# Patient Record
Sex: FEMALE | Race: WHITE | Employment: OTHER | RURAL
[De-identification: names, ages, dates, MRNs, and addresses within clinical notes are randomized per-mention and may not be internally consistent; named-entity substitution may affect disease eponyms.]

---

## 2017-11-14 ENCOUNTER — OFFICE VISIT (OUTPATIENT)
Dept: CARDIOLOGY CLINIC | Age: 77
End: 2017-11-14

## 2017-11-14 VITALS
DIASTOLIC BLOOD PRESSURE: 80 MMHG | WEIGHT: 178 LBS | BODY MASS INDEX: 26.98 KG/M2 | SYSTOLIC BLOOD PRESSURE: 108 MMHG | HEIGHT: 68 IN | HEART RATE: 80 BPM | RESPIRATION RATE: 18 BRPM | OXYGEN SATURATION: 97 %

## 2017-11-14 DIAGNOSIS — R00.2 PALPITATIONS: ICD-10-CM

## 2017-11-14 DIAGNOSIS — M05.79 RHEUMATOID ARTHRITIS INVOLVING MULTIPLE SITES WITH POSITIVE RHEUMATOID FACTOR (HCC): ICD-10-CM

## 2017-11-14 DIAGNOSIS — I47.1 PAT (PAROXYSMAL ATRIAL TACHYCARDIA) (HCC): Primary | ICD-10-CM

## 2017-11-14 RX ORDER — IBUPROFEN 200 MG
CAPSULE ORAL AS NEEDED
COMMUNITY
End: 2021-06-15

## 2017-11-14 RX ORDER — ERGOCALCIFEROL 1.25 MG/1
50000 CAPSULE ORAL
COMMUNITY

## 2017-11-14 RX ORDER — DICLOFENAC SODIUM 10 MG/G
GEL TOPICAL 4 TIMES DAILY
COMMUNITY
End: 2021-06-15

## 2017-11-14 NOTE — MR AVS SNAPSHOT
Visit Information Date & Time Provider Department Dept. Phone Encounter #  
 11/14/2017 11:20 AM Janneth Rouse MD [facility] [phone] [id number] Upcoming Health Maintenance Date Due FOBT Q 1 YEAR AGE 50-75 6/22/2017 MEDICARE YEARLY EXAM 6/23/2017 Influenza Age 5 to Adult 8/1/2017 GLAUCOMA SCREENING Q2Y 6/22/2018 DTaP/Tdap/Td series (2 - Td) 6/22/2026 Allergies as of 11/14/2017  Review Complete On: 11/14/2017 By: Janneth Rouse MD  
  
 Severity Noted Reaction Type Reactions Levaquin [Levofloxacin]  02/24/2016    Nausea Only Current Immunizations  Never Reviewed Name Date Influenza Vaccine 11/5/2014, 11/21/2013, 10/3/2012 Pneumococcal Conjugate (PCV-13) 1/18/2016 Pneumococcal Polysaccharide (PPSV-23) 2/6/2014 Not reviewed this visit You Were Diagnosed With   
  
 Codes Comments PAT (paroxysmal atrial tachycardia) (HCC)    -  Primary ICD-10-CM: I47.1 ICD-9-CM: 427.0 Palpitations     ICD-10-CM: R00.2 ICD-9-CM: 785.1 Rheumatoid arthritis involving multiple sites with positive rheumatoid factor (HCC)     ICD-10-CM: M05.79 ICD-9-CM: 714.0 Vitals BP Pulse Resp Height(growth percentile) Weight(growth percentile) SpO2  
 108/80 (BP 1 Location: Left arm, BP Patient Position: Sitting) 80 18 5' 7.5\" (1.715 m) 178 lb (80.7 kg) 97% BMI OB Status Smoking Status 27.47 kg/m2 Hysterectomy Former Smoker Vitals History BMI and BSA Data Body Mass Index Body Surface Area  
 27.47 kg/m 2 1.96 m 2 Preferred Pharmacy Pharmacy Name Phone [facility] [place]  [address] [phone] Your Updated Medication List  
  
   
This list is accurate as of: 11/14/17 12:00 PM.  Always use your most recent med list.  
  
  
  
  
 diphenhydrAMINE 25 mg tablet Commonly known as:  BENADRYL Take 12.5 mg by mouth nightly as needed for Sleep.  
  
 hydroxychloroquine 200 mg tablet Commonly known as:  PLAQUENIL Take 1 Tab by mouth daily. ibuprofen 200 mg Cap Take  by mouth as needed. sertraline 50 mg tablet Commonly known as:  ZOLOFT Take 1 Tab by mouth daily. VITAMIN D2 50,000 unit capsule Generic drug:  ergocalciferol Take 50,000 Units by mouth every seven (7) days. VOLTAREN 1 % Gel Generic drug:  diclofenac Apply  to affected area four (4) times daily. We Performed the Following AMB POC EKG ROUTINE W/ 12 LEADS, INTER & REP [74067 CPT(R)] To-Do List   
 Around 11/16/2017 ECHO:  2D ECHO COMPLETE ADULT (TTE) W OR WO CONTR Introducing [facility]! [employer] introduces [employer] patient portal. Now you can access parts of your medical record, email your doctor's office, and request medication refills online. 1. In your internet browser, go to [email or web address] 2. Click on the First Time User? Click Here link in the Sign In box. You will see the New Member Sign Up page. 3. Enter your [employer] Access Code exactly as it appears below. You will not need to use this code after youve completed the sign-up process. If you do not sign up before the expiration date, you must request a new code. · [employer] Access Code: [email or web address] Expires: 2/12/2018 12:00 PM 
 
4. Enter the last four digits of your Social Security Number (xxxx) and Date of Birth (mm/dd/yyyy) as indicated and click Submit. You will be taken to the next sign-up page. 5. Create a [employer] ID. This will be your [employer] login ID and cannot be changed, so think of one that is secure and easy to remember. 6. Create a [employer] password. You can change your password at any time. 7. Enter your Password Reset Question and Answer. This can be used at a later time if you forget your password. 8. Enter your e-mail address. You will receive e-mail notification when new information is available in [address] 9. Click Sign Up. You can now view and download portions of your medical record. 10. Click the Download Summary menu link to download a portable copy of your medical information. If you have questions, please visit the Frequently Asked Questions section of the [email or web address] website. Remember, [email or web address] is NOT to be used for urgent needs. For medical emergencies, dial 911. Now available from your iPhone and Android! Please provide this summary of care documentation to your next provider. Your primary care clinician is listed as [facility]. If you have any questions after today's visit, please call [phone].

## 2017-11-14 NOTE — PROGRESS NOTES
Verified patient with two patient identifiers. Medications reviewed/approved by Dr. Kd Jean. Verbal from Dr. Kd Jean to remove the medications that were deleted during the visit.

## 2017-11-14 NOTE — PROGRESS NOTES
Anoop Hayes is a 78 y.o. female is here for cardiac evaluation-- palpitations. No prior known cardiac hx. Hx Rheumatoid arthritis (on Plaquenil, occ steroids), osteopenia (fosamax), anxiety, tobacco use. No hx DM, hypertension, dyslipidemia (known), PVD, CVA, CAD/CHF. Recent office visit with  Dr. Christina Montoya to have irregular heart rhythm (minimal sx), labs 10/13/17 with CBC, CMP, mg normal.  Holter monitor 10/23/17 with NSR/sinus arrhythmia (HR ), PAC's, with 24 runs of SVT/PAT up to 7 beats. The patient denies chest pain/ shortness of breath, orthopnea, PND, LE edema, syncope, presyncope or fatigue. Patient Active Problem List    Diagnosis Date Noted    Palpitations 11/14/2017    PAT (paroxysmal atrial tachycardia) (HCC) 11/14/2017    Rheumatoid arthritis with positive rheumatoid factor ([facility] [address] 02/24/2016    Osteopenia 02/24/2016    MARLY (generalized anxiety disorder) 02/24/2016    Smoker 02/24/2016      Elvin Dakin, MD  Past Medical History:   Diagnosis Date    Osteopenia 11/6/2015    PAC (premature atrial contraction)     PAT (paroxysmal atrial tachycardia) (HCC)     Rheumatoid arthritis (HCC)       Past Surgical History:   Procedure Laterality Date    HX APPENDECTOMY      spleen    HX HYSTERECTOMY      HX SHOULDER ARTHROSCOPY      HX SPLENECTOMY       Allergies   Allergen Reactions    Levaquin [Levofloxacin] Not Reported This Time      Family History   Problem Relation Age of Onset    Breast Cancer Mother     Breast Cancer Sister     Diabetes Father     Heart Disease Father       Social History     Social History    Marital status:      Spouse name: N/A    Number of children: N/A    Years of education: N/A     Occupational History    Not on file.      Social History Main Topics    Smoking status: Former Smoker     Packs/day: 1.00     Types: Cigarettes     Quit date: 4/30/2016    Smokeless tobacco: Never Used    Alcohol use 4.2 oz/week     7 Standard drinks or equivalent per week    Drug use: No    Sexual activity: Not on file     Other Topics Concern    Not on file     Social History Narrative      Current Outpatient Prescriptions   Medication Sig    predniSONE (DELTASONE) 20 mg tablet 5 po every day x 4 days then 4 on day 5, 3 on day 6, 2 on day 7, and 1 on day 8    sertraline (ZOLOFT) 50 mg tablet Take 1 Tab by mouth daily.  hydroxychloroquine (PLAQUENIL) 200 mg tablet Take 1 Tab by mouth daily.  alendronate (FOSAMAX) 70 mg tablet Take 70 mg by mouth every Sunday.  methylPREDNISolone (MEDROL DOSEPACK) 4 mg tablet PRN RA  Indications: RHEUMATOID ARTHRITIS    diphenhydrAMINE (BENADRYL) 25 mg tablet Take 12.5 mg by mouth nightly as needed for Sleep. No current facility-administered medications for this visit. Review of Symptoms:    CONST  No weight change. No fever, chills, sweats    ENT No visual changes, URI sx, sore throat    CV  See HPI   RESP  No cough, or sputum, wheezing. Also see HPI   GI  No abdominal pain or change in bowel habits. No heartburn or dysphagia. No melena or rectal bleeding.   No dysuria, urgency, frequency, hematuria   MSKEL  No joint pain, swelling. No muscle pain. SKIN  No rash or lesions. NEURO  No headache, syncope, or seizure. No weakness, loss of sensation, or paresthesias. PSYCH  No low mood or depression  No anxiety. HE/LYMPH  No easy bruising, abnormal bleeding, or enlarged glands.         Physical ExamPhysical Exam:    Visit Vitals    Resp 18    Ht 5' 7.5\" (1.715 m)    Wt 178 lb (80.7 kg)    BMI 27.47 kg/m2     Gen: NAD  HEENT:  PERRL, throat clear  Neck: no adenopathy, no thyromegaly, no JVD   Heart:  Regular,Nl S1S2,  no murmur, gallop or rub.   Lungs:  clear  Abdomen:   Soft, non-tender, bowel sounds are active.   Extremities:  No edema  Pulse: symmetric  Neuro: A&O times 3, No focal neuro deficits    Cardiographics    ECG: NSR, IRBBB      Assessment:         Patient Active Problem List    Diagnosis Date Noted    Palpitations 11/14/2017    PAT (paroxysmal atrial tachycardia) (HCC) 11/14/2017    Rheumatoid arthritis with positive rheumatoid factor ([facility] [address] 02/24/2016    Osteopenia 02/24/2016    MARLY (generalized anxiety disorder) 02/24/2016    Smoker 02/24/2016       78 y.o. female is here for cardiac evaluation-- palpitations. No prior known cardiac hx. Hx Rheumatoid arthritis (on Plaquenil, occ steroids), osteopenia (fosamax), anxiety, tobacco use. No hx DM, hypertension, dyslipidemia (known), PVD, CVA, CAD/CHF. Recent office visit with  Dr. Neelima Doll to have irregular heart rhythm (minimal sx), labs 10/13/17 with CBC, CMP, mg normal.  Holter monitor 10/23/17 with NSR/sinus arrhythmia (HR ), PAC's, with 24 runs of SVT/PAT up to 7 beats. No CP, YORK, other CV sx or complaints.      Plan:     Echo/doppler--call w/ results  Has already had out-pt Holter  Discussed possible stress treadmill--she prefers to hold off and no real sx indicating need  Lipids, thyroids per PCP  Is \"weaning\" nicotine replacement gum  Limited ETOH, caffeine  F/u with PCP as planned  No indication for rx at this point, minimal sx    Mary Ross MD

## 2017-11-30 ENCOUNTER — TELEPHONE (OUTPATIENT)
Dept: CARDIOLOGY CLINIC | Age: 77
End: 2017-11-30

## 2017-11-30 NOTE — TELEPHONE ENCOUNTER
----- Message from Charlee Shine MD sent at 11/30/2017  8:51 AM EST -----  Regarding: RE: echo  Can f/u prn here (no real sx or indication for treatment at this point), otherwise f/u with PCP. Thanks [facility]    ----- Message -----     From: Patrisha Opitz, LPN     Sent: 18/14/4661   4:45 PM       To: Charlee Shine MD  Subject: FW: echo                                          Did you want to see this pt in follow up? Rianna Bright  ----- Message -----     From: Charlee Shine MD     Sent: 11/29/2017   9:25 AM       To: Patrisha Opitz, LPN  Subject: echo                                             Call/advise echo looks good, normal LV pumping function/valves, overall ok.   Thanks [employer]

## 2017-11-30 NOTE — TELEPHONE ENCOUNTER
Verified patient with two patient identifiers. Results given and questions answered. Patient verbalized understanding.

## 2019-12-12 ENCOUNTER — CLINICAL SUPPORT (OUTPATIENT)
Dept: CARDIOLOGY CLINIC | Age: 79
End: 2019-12-12

## 2019-12-12 ENCOUNTER — OFFICE VISIT (OUTPATIENT)
Dept: CARDIOLOGY CLINIC | Age: 79
End: 2019-12-12

## 2019-12-12 VITALS
WEIGHT: 176 LBS | RESPIRATION RATE: 12 BRPM | SYSTOLIC BLOOD PRESSURE: 150 MMHG | BODY MASS INDEX: 26.67 KG/M2 | OXYGEN SATURATION: 97 % | HEIGHT: 68 IN | DIASTOLIC BLOOD PRESSURE: 80 MMHG | HEART RATE: 90 BPM

## 2019-12-12 DIAGNOSIS — I49.9 CARDIAC ARRHYTHMIA, UNSPECIFIED CARDIAC ARRHYTHMIA TYPE: ICD-10-CM

## 2019-12-12 DIAGNOSIS — I47.1 PAT (PAROXYSMAL ATRIAL TACHYCARDIA) (HCC): ICD-10-CM

## 2019-12-12 DIAGNOSIS — R00.2 PALPITATIONS: Primary | ICD-10-CM

## 2019-12-12 DIAGNOSIS — R00.2 PALPITATIONS: ICD-10-CM

## 2019-12-12 RX ORDER — METOPROLOL SUCCINATE 25 MG/1
25 TABLET, EXTENDED RELEASE ORAL DAILY
Qty: 90 TAB | Refills: 0 | Status: SHIPPED | OUTPATIENT
Start: 2019-12-12 | End: 2020-03-09

## 2019-12-12 NOTE — PATIENT INSTRUCTIONS
Per Dr. Marshall Nieves: 
Michael Carrier like Ms. Jens Yarbrough is having more svt, perhaps even some afib. Will start toprol xl and provide her with a 30day event monitor to make sure no afib. If only svt and medication (metoprolol)  ineffective would refer for ablation. Educational material will be provided.

## 2019-12-12 NOTE — PROGRESS NOTES
metoprolol succinate (TOPROL-XL) 25 mg XL tablet [[id number]]     Order Details   Dose: 25 mg Route: Oral Frequency: DAILY   Dispense Quantity: 90 Tab Refills: 0 Fills remaining: --           Sig: Take 1 Tab by mouth daily. Verbal order per Dr. Tara Lamas. Order (medication, dose, route, frequency, amount, refills) repeated and verified twice.

## 2019-12-12 NOTE — PROGRESS NOTES
PATIENT ID VERIFIED WITH TWO PATIENT IDENTIFIERS. PATIENT MEDICATIONS REVIEWED AND APPROVED BY . Chief Complaint   Patient presents with    Palpitations     Problem visit--last seen in office 2 years ago       1. Have you been to the ER, urgent care clinic since your last visit? Hospitalized since your last visit? Last seen in office 2 years ago    2. Have you seen or consulted any other health care providers outside of the [address] [place] [address] since your last visit? Include any pap smears or colon screening.  Last seen 2 years ago

## 2019-12-12 NOTE — PROGRESS NOTES
HISTORY OF PRESENT ILLNESS  Bethany Griffith is a 70 y.o. female     SUMMARY:   Problem List  Date Reviewed: 12/12/2019          Codes Class Noted    Palpitations ICD-10-CM: R00.2  ICD-9-CM: 785.1  11/14/2017        PAT (paroxysmal atrial tachycardia) (HCC) ICD-10-CM: I47.1  ICD-9-CM: 427.0  11/14/2017        Rheumatoid arthritis with positive rheumatoid factor (HCC) ICD-10-CM: M05.9  ICD-9-CM: 714.0  2/24/2016        Osteopenia ICD-10-CM: M85.80  ICD-9-CM: 733.90  2/24/2016        MARLY (generalized anxiety disorder) ICD-10-CM: F41.1  ICD-9-CM: 300.02  2/24/2016        Smoker ICD-10-CM: B79.450  ICD-9-CM: 305.1  2/24/2016              Current Outpatient Medications on File Prior to Visit   Medication Sig    ibuprofen 200 mg cap Take  by mouth as needed.  sertraline (ZOLOFT) 50 mg tablet Take 1 Tab by mouth daily.  hydroxychloroquine (PLAQUENIL) 200 mg tablet Take 1 Tab by mouth daily.  diphenhydrAMINE (BENADRYL) 25 mg tablet Take 25 mg by mouth nightly.  diclofenac (VOLTAREN) 1 % gel Apply  to affected area four (4) times daily.  ergocalciferol (VITAMIN D2) 50,000 unit capsule Take 50,000 Units by mouth every seven (7) days. No current facility-administered medications on file prior to visit. CARDIOLOGY STUDIES TO DATE:  10/17 holter rare pvcs, freq pacs, 24 short runs of svt  11/17 normal echo  9/18 neg exercise stress test    Chief Complaint   Patient presents with    Palpitations     Problem visit--last seen in office 2 years ago     HPI : ms. Shania Pineda returns having not been seen for about 2 yrs. She is having worsening palpitations, maybe 2-3 times per day lasting 10-20 secs, often stopped with a limited valsalva maneuver. She is active but no regular exercise. I have reviewed and summarized her prior testing above. Some ongoing arthragias related to her rheumatoid.   CARDIAC ROS:   negative for chest pain, dyspnea, syncope, orthopnea, paroxysmal nocturnal dyspnea, exertional chest pressure/discomfort, claudication, lower extremity edema    Family History   Problem Relation Age of Onset    Breast Cancer Mother     Breast Cancer Sister     Diabetes Father     Heart Disease Father        Past Medical History:   Diagnosis Date    Menopause     Osteopenia 11/6/2015    PAC (premature atrial contraction)     PAT (paroxysmal atrial tachycardia) (HCC)     Rheumatoid arthritis (HCC)        GENERAL ROS:  A comprehensive review of systems was negative except for that written in the HPI. Visit Vitals  /80 (BP 1 Location: Right arm, BP Patient Position: Sitting)   Pulse 90   Resp 12   Ht 5' 7.5\" (1.715 m)   Wt 176 lb (79.8 kg)   SpO2 97%   BMI 27.16 kg/m²       Wt Readings from Last 3 Encounters:   12/12/19 176 lb (79.8 kg)   11/14/17 178 lb (80.7 kg)   06/22/16 176 lb 9.6 oz (80.1 kg)            BP Readings from Last 3 Encounters:   12/12/19 150/80   11/14/17 108/80   09/15/16 134/60       PHYSICAL EXAM  General appearance: alert, cooperative, no distress, appears stated age  Neurologic: Alert and oriented X 3  Neck: supple, symmetrical, trachea midline, no adenopathy, no carotid bruit and no JVD  Lungs: clear to auscultation bilaterally  Abdomen: soft, non-tender. Bowel sounds normal. No masses,  no organomegaly  Extremities: extremities normal, atraumatic, no cyanosis or edema      ASSESSMENT  Sounds like she is having more svt, perhaps even some afib. Will start toprol xl and provide her with a 30day event monitor to make sure no afib. If only svt and medications ineffective would refer for ablation. current treatment plan is effective, no change in therapy  lab results and schedule of future lab studies reviewed with patient  reviewed diet, exercise and weight control    Encounter Diagnoses   Name Primary?     Palpitations Yes    PAT (paroxysmal atrial tachycardia) (HCC)      Orders Placed This Encounter    AMB POC EKG ROUTINE W/ 12 LEADS, INTER & REP    metoprolol succinate (TOPROL-XL) 25 mg XL tablet       Follow-up and Dispositions    · Return in about 4 weeks (around 1/9/2020).          Janice Lopez MD  12/12/2019

## 2019-12-16 NOTE — PROGRESS NOTES
Enrolled pt into [employer] Experience system. EM will be mailed to the pt. 24 hour Holter monitor only. Verified patient with two patient identifiers. Pt verbalized understanding of its use. Ordering YULIET Brumfield Signs (reading Randalyn Rings)  Reason: Palpitations [R00.2 (ICD-10-CM)]; PAT (paroxysmal atrial tachycardia) ([facility] [address] [I47.1 (ICD-10-CM)]; Cardiac arrhythmia, unspecified cardiac arrhythmia type [I49.9 (ICD-10-CM)]    Return date: 1/11/20        No LOS.

## 2020-02-07 ENCOUNTER — TELEPHONE (OUTPATIENT)
Dept: CARDIOLOGY CLINIC | Age: 80
End: 2020-02-07

## 2020-02-07 NOTE — TELEPHONE ENCOUNTER
----- Message from Jurgen Coe MD sent at 1/28/2020 12:15 PM EST -----  Regarding: Event monitor  Advise PAC\"s and short runs of atrial tachy (3-4 beats)--no afib seen. Continue metoprolol low dose. Followup 6 mos, sooner prn. Thanks [employer]    Spoke with the patient. Verified patient with two patient identifiers. Results given and questions answered. Patient verbalized understanding. Pt wants to keep her appt for next Thursday to go over results.

## 2020-02-27 ENCOUNTER — OFFICE VISIT (OUTPATIENT)
Dept: CARDIOLOGY CLINIC | Age: 80
End: 2020-02-27

## 2020-02-27 VITALS
OXYGEN SATURATION: 98 % | DIASTOLIC BLOOD PRESSURE: 82 MMHG | HEART RATE: 58 BPM | RESPIRATION RATE: 14 BRPM | WEIGHT: 176 LBS | SYSTOLIC BLOOD PRESSURE: 130 MMHG | HEIGHT: 68 IN | BODY MASS INDEX: 26.67 KG/M2

## 2020-02-27 DIAGNOSIS — R00.2 PALPITATIONS: Primary | ICD-10-CM

## 2020-02-27 DIAGNOSIS — M05.79 RHEUMATOID ARTHRITIS INVOLVING MULTIPLE SITES WITH POSITIVE RHEUMATOID FACTOR (HCC): ICD-10-CM

## 2020-02-27 DIAGNOSIS — I47.1 PAT (PAROXYSMAL ATRIAL TACHYCARDIA) (HCC): ICD-10-CM

## 2020-02-27 DIAGNOSIS — I49.1 PAC (PREMATURE ATRIAL CONTRACTION): ICD-10-CM

## 2020-02-27 NOTE — PROGRESS NOTES
Dilma Nazario is a 70 y.o. female is here for f/u--seen in Dec 2019 by Dr. Amelia Lu with increased palpitations--started low dose metoprolol, 30 day Event monitor (only 3 days) with PAC\"s and short runs of atrial tachy (3-4 beats)--no afib seen. Had Stress Treadmill last summer (ok) and Echo 11/17 with LVEF 10-46, grade I diastolic, otherwise normal.  Palpitations improved, no prolonged episodes. .  The patient denies chest pain/ shortness of breath, orthopnea, PND, LE edema,syncope, presyncope or fatigue.        Patient Active Problem List    Diagnosis Date Noted    Palpitations 11/14/2017    PAT (paroxysmal atrial tachycardia) [facility] 11/14/2017    Rheumatoid arthritis with positive rheumatoid factor ([facility] [address] 02/24/2016    Osteopenia 02/24/2016    MARLY (generalized anxiety disorder) 02/24/2016    Smoker 02/24/2016      Desire Guallpa MD  Past Medical History:   Diagnosis Date    Menopause     Osteopenia 11/6/2015    PAC (premature atrial contraction)     PAT (paroxysmal atrial tachycardia) (HCC)     Rheumatoid arthritis (HCC)       Past Surgical History:   Procedure Laterality Date    HX APPENDECTOMY      spleen    HX HYSTERECTOMY      HX OOPHORECTOMY      HX SHOULDER ARTHROSCOPY      HX SPLENECTOMY       Allergies   Allergen Reactions    Levaquin [Levofloxacin] Nausea Only      Family History   Problem Relation Age of Onset    Breast Cancer Mother     Breast Cancer Sister     Diabetes Father     Heart Disease Father       Social History     Socioeconomic History    Marital status:      Spouse name: Not on file    Number of children: Not on file    Years of education: Not on file    Highest education level: Not on file   Occupational History    Not on file   Social Needs    Financial resource strain: Not on file    Food insecurity:     Worry: Not on file     Inability: Not on file    Transportation needs:     Medical: Not on file     Non-medical: Not on file   Tobacco Use    Smoking status: Current Every Day Smoker     Packs/day: 1.00     Types: Cigarettes     Last attempt to quit: 4/30/2016     Years since quitting: 3.8    Smokeless tobacco: Never Used   Substance and Sexual Activity    Alcohol use: Yes     Alcohol/week: 7.0 standard drinks     Types: 7 Standard drinks or equivalent per week    Drug use: No    Sexual activity: Not on file   Lifestyle    Physical activity:     Days per week: Not on file     Minutes per session: Not on file    Stress: Not on file   Relationships    Social connections:     Talks on phone: Not on file     Gets together: Not on file     Attends [employer]odoxy service: Not on file     Active member of club or organization: Not on file     Attends meetings of clubs or organizations: Not on file     Relationship status: Not on file    Intimate partner violence:     Fear of current or ex partner: Not on file     Emotionally abused: Not on file     Physically abused: Not on file     Forced sexual activity: Not on file   Other Topics Concern    Not on file   Social History Narrative    Not on file      Current Outpatient Medications   Medication Sig    metoprolol succinate (TOPROL-XL) 25 mg XL tablet Take 1 Tab by mouth daily.  diclofenac (VOLTAREN) 1 % gel Apply  to affected area four (4) times daily.  ibuprofen 200 mg cap Take  by mouth as needed.  ergocalciferol (VITAMIN D2) 50,000 unit capsule Take 50,000 Units by mouth every seven (7) days.  sertraline (ZOLOFT) 50 mg tablet Take 1 Tab by mouth daily.  hydroxychloroquine (PLAQUENIL) 200 mg tablet Take 1 Tab by mouth daily.  diphenhydrAMINE (BENADRYL) 25 mg tablet Take 25 mg by mouth nightly. No current facility-administered medications for this visit. Review of Symptoms:    CONST  No weight change. No fever, chills, sweats    ENT No visual changes, URI sx, sore throat    CV  See HPI   RESP  No cough, or sputum, wheezing.  Also see HPI   GI  No abdominal pain or change in bowel habits. No heartburn or dysphagia. No melena or rectal bleeding.   No dysuria, urgency, frequency, hematuria   MSKEL  No joint pain, swelling. No muscle pain. SKIN  No rash or lesions. NEURO  No headache, syncope, or seizure. No weakness, loss of sensation, or paresthesias. PSYCH  No low mood or depression  No anxiety. HE/LYMPH  No easy bruising, abnormal bleeding, or enlarged glands. Physical ExamPhysical Exam:    There were no vitals taken for this visit. Gen: NAD  HEENT:  PERRL, throat clear  Neck: no adenopathy, no thyromegaly, no JVD   Heart:  Regular,Nl S1S2,  no murmur, gallop or rub.   Lungs:  clear  Abdomen:   Soft, non-tender, bowel sounds are active.   Extremities:  No edema  Pulse: symmetric  Neuro: A&O times 3, No focal neuro deficits    Cardiographics      Assessment:         Patient Active Problem List    Diagnosis Date Noted    Palpitations 11/14/2017    PAT (paroxysmal atrial tachycardia) ([facility] [address] 11/14/2017    Rheumatoid arthritis with positive rheumatoid factor ([facility] [address] 02/24/2016    Osteopenia 02/24/2016    MARLY (generalized anxiety disorder) 02/24/2016    Smoker 02/24/2016     70 y.o. female is here for f/u--seen in Dec 2019 by Dr. Vinicius Robles with increased palpitations--started low dose metoprolol, 30 day Event monitor (only 3 days) with PAC\"s and short runs of atrial tachy (3-4 beats)--no afib seen. Had Stress Treadmill last summer (ok) and Echo 11/17 with LVEF 71-20, grade I diastolic, otherwise normal.  Palpitations improved, no prolonged episodes. Plan:     Doing well with no adverse cardiac symptoms--improved with Metoprolol. .  Lipids and labs followed by PCP. Continue current care and f/u in 6 months.     Blake Molina MD

## 2020-02-27 NOTE — PROGRESS NOTES
Verified patient with two patient identifiers. Medications reviewed/approved by Dr. Anali Miranda. 1. Have you been to the ER, urgent care clinic since your last visit? Hospitalized since your last visit? no    2. Have you seen or consulted any other health care providers outside of the [address] [place] [address] since your last visit? Include any pap smears or colon screening.  no

## 2020-03-09 RX ORDER — METOPROLOL SUCCINATE 25 MG/1
TABLET, EXTENDED RELEASE ORAL
Qty: 90 TAB | Refills: 0 | Status: SHIPPED | OUTPATIENT
Start: 2020-03-09 | End: 2020-06-16 | Stop reason: SDUPTHER

## 2020-06-16 RX ORDER — METOPROLOL SUCCINATE 25 MG/1
TABLET, EXTENDED RELEASE ORAL
Qty: 90 TAB | Refills: 1 | Status: SHIPPED | OUTPATIENT
Start: 2020-06-16 | End: 2020-12-15

## 2020-06-16 NOTE — TELEPHONE ENCOUNTER
[address] called stating that they have been trying to get a refill on her Metoprolol 25mg xl sent over to them, and have not hear anything back. It looks like it was last written by Dr. Moncho Singer. Is this something that we can do or does he have to do it.   Please call them at [phone]

## 2020-11-24 ENCOUNTER — OFFICE VISIT (OUTPATIENT)
Dept: CARDIOLOGY CLINIC | Age: 80
End: 2020-11-24
Payer: MEDICARE

## 2020-11-24 VITALS
OXYGEN SATURATION: 96 % | WEIGHT: 178.8 LBS | RESPIRATION RATE: 16 BRPM | HEIGHT: 68 IN | TEMPERATURE: 96.2 F | HEART RATE: 84 BPM | BODY MASS INDEX: 27.1 KG/M2 | SYSTOLIC BLOOD PRESSURE: 132 MMHG | DIASTOLIC BLOOD PRESSURE: 72 MMHG

## 2020-11-24 DIAGNOSIS — I47.1 PAT (PAROXYSMAL ATRIAL TACHYCARDIA) (HCC): ICD-10-CM

## 2020-11-24 DIAGNOSIS — R00.2 PALPITATIONS: Primary | ICD-10-CM

## 2020-11-24 DIAGNOSIS — F41.1 GAD (GENERALIZED ANXIETY DISORDER): ICD-10-CM

## 2020-11-24 DIAGNOSIS — M05.79 RHEUMATOID ARTHRITIS INVOLVING MULTIPLE SITES WITH POSITIVE RHEUMATOID FACTOR (HCC): ICD-10-CM

## 2020-11-24 DIAGNOSIS — I49.1 PAC (PREMATURE ATRIAL CONTRACTION): ICD-10-CM

## 2020-11-24 PROCEDURE — 99214 OFFICE O/P EST MOD 30 MIN: CPT | Performed by: INTERNAL MEDICINE

## 2020-11-24 PROCEDURE — G8399 PT W/DXA RESULTS DOCUMENT: HCPCS | Performed by: INTERNAL MEDICINE

## 2020-11-24 PROCEDURE — G8427 DOCREV CUR MEDS BY ELIG CLIN: HCPCS | Performed by: INTERNAL MEDICINE

## 2020-11-24 PROCEDURE — 1101F PT FALLS ASSESS-DOCD LE1/YR: CPT | Performed by: INTERNAL MEDICINE

## 2020-11-24 PROCEDURE — G8419 CALC BMI OUT NRM PARAM NOF/U: HCPCS | Performed by: INTERNAL MEDICINE

## 2020-11-24 PROCEDURE — 93000 ELECTROCARDIOGRAM COMPLETE: CPT | Performed by: INTERNAL MEDICINE

## 2020-11-24 PROCEDURE — 1090F PRES/ABSN URINE INCON ASSESS: CPT | Performed by: INTERNAL MEDICINE

## 2020-11-24 PROCEDURE — G8536 NO DOC ELDER MAL SCRN: HCPCS | Performed by: INTERNAL MEDICINE

## 2020-11-24 PROCEDURE — G8432 DEP SCR NOT DOC, RNG: HCPCS | Performed by: INTERNAL MEDICINE

## 2020-11-24 NOTE — PROGRESS NOTES
Raisa Cardona is a 68 y.o. female is here for routine f/u. Seen in Dec 2019 by Dr. Rose Cain with increased palpitations--started low dose metoprolol, 30 day Event monitor (only 3 days) with PAC\"s and short runs of atrial tachy (3-4 beats)--no afib seen. Had Stress Treadmill last summer (ok) and Echo 11/17 with LVEF 87-53, grade I diastolic, otherwise normal.  Palpitations improved, no prolonged episodes. . Labs 7/10/20 with normal CMP, chol 213, , HDL 52, , TSH normal.   The patient denies chest pain/ shortness of breath, orthopnea, PND, LE edema,  syncope, presyncope or fatigue.        Patient Active Problem List    Diagnosis Date Noted    Palpitations 11/14/2017    PAT (paroxysmal atrial tachycardia) (HCC) 11/14/2017    Rheumatoid arthritis with positive rheumatoid factor ([facility] [address] 02/24/2016    Osteopenia 02/24/2016    MARLY (generalized anxiety disorder) 02/24/2016    Smoker 02/24/2016      Desire Guallpa MD  Past Medical History:   Diagnosis Date    Menopause     Osteopenia 11/6/2015    PAC (premature atrial contraction)     PAT (paroxysmal atrial tachycardia) (HCC)     Rheumatoid arthritis (HCC)       Past Surgical History:   Procedure Laterality Date    HX APPENDECTOMY      spleen    HX HYSTERECTOMY      HX OOPHORECTOMY      HX SHOULDER ARTHROSCOPY      HX SPLENECTOMY       Allergies   Allergen Reactions    Levaquin [Levofloxacin] Nausea Only      Family History   Problem Relation Age of Onset    Breast Cancer Mother     Breast Cancer Sister     Diabetes Father     Heart Disease Father       Social History     Socioeconomic History    Marital status:      Spouse name: Not on file    Number of children: Not on file    Years of education: Not on file    Highest education level: Not on file   Occupational History    Not on file   Social Needs    Financial resource strain: Not on file    Food insecurity     Worry: Not on file     Inability: Not on file   [facility] Transportation needs     Medical: Not on file     Non-medical: Not on file   Tobacco Use    Smoking status: Current Every Day Smoker     Packs/day: 1.00     Years: 30.00     Pack years: 30.00     Types: Cigarettes     Last attempt to quit: 2016     Years since quittin.5    Smokeless tobacco: Never Used   Substance and Sexual Activity    Alcohol use: Yes     Alcohol/week: 7.0 standard drinks     Types: 7 Standard drinks or equivalent per week    Drug use: No    Sexual activity: Not on file   Lifestyle    Physical activity     Days per week: Not on file     Minutes per session: Not on file    Stress: Not on file   Relationships    Social connections     Talks on phone: Not on file     Gets together: Not on file     Attends Church service: Not on file     Active member of club or organization: Not on file     Attends meetings of clubs or organizations: Not on file     Relationship status: Not on file    Intimate partner violence     Fear of current or ex partner: Not on file     Emotionally abused: Not on file     Physically abused: Not on file     Forced sexual activity: Not on file   Other Topics Concern    Not on file   Social History Narrative    Not on file      Current Outpatient Medications   Medication Sig    metoprolol succinate (TOPROL-XL) 25 mg XL tablet Take 1 tablet by mouth once daily    ibuprofen 200 mg cap Take  by mouth as needed.  ergocalciferol (VITAMIN D2) 50,000 unit capsule Take 50,000 Units by mouth every seven (7) days.  sertraline (ZOLOFT) 50 mg tablet Take 1 Tab by mouth daily.  hydroxychloroquine (PLAQUENIL) 200 mg tablet Take 1 Tab by mouth daily.  diphenhydrAMINE (BENADRYL) 25 mg tablet Take 25 mg by mouth nightly.  diclofenac (VOLTAREN) 1 % gel Apply  to affected area four (4) times daily. No current facility-administered medications for this visit. Review of Symptoms:    CONST  No weight change.  No fever, chills, sweats    ENT No visual changes, URI sx, sore throat    CV  See HPI   RESP  No cough, or sputum, wheezing. Also see HPI   GI  No abdominal pain or change in bowel habits. No heartburn or dysphagia. No melena or rectal bleeding.   No dysuria, urgency, frequency, hematuria   MSKEL  No joint pain, swelling. No muscle pain. SKIN  No rash or lesions. NEURO  No headache, syncope, or seizure. No weakness, loss of sensation, or paresthesias. PSYCH  No low mood or depression  No anxiety. HE/LYMPH  No easy bruising, abnormal bleeding, or enlarged glands. Physical ExamPhysical Exam:    Visit Vitals  /72 (BP 1 Location: Left arm, BP Patient Position: Sitting)   Pulse 84   Temp (!) 96.2 °F (35.7 °C) (Temporal)   Resp 16   Ht 5' 7.5\" (1.715 m)   Wt 178 lb 12.8 oz (81.1 kg)   SpO2 96%   BMI 27.59 kg/m²     Gen: NAD  HEENT:  PERRL, throat clear  Neck: no adenopathy, no thyromegaly, no JVD   Heart:  Regular,Nl S1S2,  no murmur, gallop or rub. Lungs:  clear  Abdomen:   Soft, non-tender, bowel sounds are active. Extremities:  No edema  Pulse: symmetric  Neuro: A&O times 3, No focal neuro deficits    Cardiographics    ECG: NSR, IRBB, no changes      Assessment:         Patient Active Problem List    Diagnosis Date Noted    Palpitations 11/14/2017    PAT (paroxysmal atrial tachycardia) (HCC) 11/14/2017    Rheumatoid arthritis with positive rheumatoid factor ([facility] [address] 02/24/2016    Osteopenia 02/24/2016    MARLY (generalized anxiety disorder) 02/24/2016    Smoker 02/24/2016     Seen in Dec 2019 by Dr. Shanti Rhodes with increased palpitations--started low dose metoprolol, 30 day Event monitor (only 3 days) with PAC\"s and short runs of atrial tachy (3-4 beats)--no afib seen. Had Stress Treadmill last summer (ok) and Echo 11/17 with LVEF 32-71, grade I diastolic, otherwise normal.  Palpitations improved, no prolonged episodes.  Labs 7/10/20 with normal CMP, chol 213, , HDL 52, , TSH normal.      Plan:     Doing well with no adverse cardiac symptoms. Lipids and labs followed by PCP. Continue current care and f/u in 6 months.     Eliazar Keenan MD

## 2020-11-24 NOTE — PROGRESS NOTES
Chief Complaint   Patient presents with    Rapid Heart Rate     follow up     Verified patient with two patient identifiers. Medications reviewed/approved by Dr. Colten Obrien. 1. Have you been to the ER, urgent care clinic since your last visit? Hospitalized since your last visit?no    2. Have you seen or consulted any other health care providers outside of the [address] [place] [address] since your last visit? Include any pap smears or colon screening.  no    PHQ 9 Score 6 has been feeling this way since starting the metoprolol

## 2020-11-24 NOTE — LETTER
11/24/20 Patient: Tabatha Sheppard YOB: 1940 Date of Visit: 11/24/2020 Howard Calvert MD 
[address] [place] [id number] VIA Facsimile: [phone] Dear Howard Calvert MD, Thank you for referring Ms. Christine Woodward to [facility] for evaluation. My notes for this consultation are attached. If you have questions, please do not hesitate to call me. I look forward to following your patient along with you.  
 
 
Sincerely, 
 
Mary Paiz MD

## 2021-05-25 ENCOUNTER — OFFICE VISIT (OUTPATIENT)
Dept: CARDIOLOGY CLINIC | Age: 81
End: 2021-05-25
Payer: MEDICARE

## 2021-05-25 VITALS
DIASTOLIC BLOOD PRESSURE: 70 MMHG | SYSTOLIC BLOOD PRESSURE: 128 MMHG | OXYGEN SATURATION: 99 % | BODY MASS INDEX: 26.83 KG/M2 | WEIGHT: 177 LBS | HEART RATE: 73 BPM | TEMPERATURE: 96.8 F | HEIGHT: 68 IN | RESPIRATION RATE: 16 BRPM

## 2021-05-25 DIAGNOSIS — M05.79 RHEUMATOID ARTHRITIS INVOLVING MULTIPLE SITES WITH POSITIVE RHEUMATOID FACTOR (HCC): ICD-10-CM

## 2021-05-25 DIAGNOSIS — R00.2 PALPITATIONS: Primary | ICD-10-CM

## 2021-05-25 DIAGNOSIS — I49.1 PAC (PREMATURE ATRIAL CONTRACTION): ICD-10-CM

## 2021-05-25 DIAGNOSIS — I47.1 PAT (PAROXYSMAL ATRIAL TACHYCARDIA) (HCC): ICD-10-CM

## 2021-05-25 PROCEDURE — 99214 OFFICE O/P EST MOD 30 MIN: CPT | Performed by: INTERNAL MEDICINE

## 2021-05-25 PROCEDURE — G8510 SCR DEP NEG, NO PLAN REQD: HCPCS | Performed by: INTERNAL MEDICINE

## 2021-05-25 PROCEDURE — 1090F PRES/ABSN URINE INCON ASSESS: CPT | Performed by: INTERNAL MEDICINE

## 2021-05-25 PROCEDURE — 93000 ELECTROCARDIOGRAM COMPLETE: CPT | Performed by: INTERNAL MEDICINE

## 2021-05-25 PROCEDURE — G8399 PT W/DXA RESULTS DOCUMENT: HCPCS | Performed by: INTERNAL MEDICINE

## 2021-05-25 PROCEDURE — G8419 CALC BMI OUT NRM PARAM NOF/U: HCPCS | Performed by: INTERNAL MEDICINE

## 2021-05-25 PROCEDURE — G8536 NO DOC ELDER MAL SCRN: HCPCS | Performed by: INTERNAL MEDICINE

## 2021-05-25 PROCEDURE — 1101F PT FALLS ASSESS-DOCD LE1/YR: CPT | Performed by: INTERNAL MEDICINE

## 2021-05-25 PROCEDURE — G8427 DOCREV CUR MEDS BY ELIG CLIN: HCPCS | Performed by: INTERNAL MEDICINE

## 2021-05-25 NOTE — PROGRESS NOTES
Identified pt with two pt identifiers(name and ). Reviewed record in preparation for visit and have obtained necessary documentation. Chief Complaint   Patient presents with    Irregular Heart Beat     PAT    Rib Pain     pt c/o a sharp pain behind L rib cage when bending over      Vitals:    21 1421   BP: 128/70   Pulse: 73   Resp: 16   Temp: 96.8 °F (36 °C)   TempSrc: Temporal   SpO2: 99%   Weight: 177 lb (80.3 kg)   Height: 5' 7.5\" (1.715 m)   PainSc:   8   PainLoc: Rib Cage       Health Maintenance Review: Patient reminded of \"due or due soon\" health maintenance. I have asked the patient to contact his/her primary care provider (PCP) for follow-up on his/her health maintenance. Coordination of Care Questionnaire:  :   1) Have you been to an emergency room, urgent care, or hospitalized since your last visit? If yes, where when, and reason for visit? no       2. Have seen or consulted any other health care provider since your last visit? If yes, where when, and reason for visit? NO      Patient is accompanied by self I have received verbal consent from Concepcion Vega to discuss any/all medical information while they are present in the room.

## 2021-05-25 NOTE — LETTER
5/25/2021 Patient: Anahy Ferreira YOB: 1940 Date of Visit: 5/25/2021 Elise Joshua MD 
[facility] [id number] Via Fax: [phone] Dear Elise Joshua MD, Thank you for referring Ms. Sid Guzman to [facility] for evaluation. My notes for this consultation are attached. If you have questions, please do not hesitate to call me. I look forward to following your patient along with you.  
 
 
Sincerely, 
 
Aaron Edwards MD

## 2021-05-25 NOTE — PROGRESS NOTES
Kalli Crain is a 68 y.o. female is here for routine f/u. Seen in [facility] [address] Dr. Pamela Suarez with increased palpitations--started low dose metoprolol, 30 day Event monitor (only 3 days) with PAC\"s and short runs of atrial tachy (3-4 beats)--no afib seen. Had Stress Treadmill last summer (ok) and Echo 11/17 with LVEF 75-09, grade I diastolic, otherwise normal.  Palpitations improved, no prolonged episodes. . Labs 7/10/20 with normal CMP, chol 213, , HDL 52, , TSH normal.  Issues with sciatica--s/p steroids. Also with L lower rib discomfort when bends forward. The patient denies exertional chest pain/ shortness of breath, orthopnea, PND, LE edema, palpitations, syncope, presyncope or fatigue.        Patient Active Problem List    Diagnosis Date Noted    Palpitations 11/14/2017    [facility]mal [facility] 11/14/2017    Rheumatoid arthritis with positive rheumatoid factor ([facility] [address] 02/24/2016    Osteopenia 02/24/2016    MARLY (generalized anxiety disorder) 02/24/2016    Smoker 02/24/2016      Desire Guallpa MD  Past Medical History:   Diagnosis Date    Menopause     Osteopenia 11/6/2015    PAC (premature atrial contraction)     PAT (paroxysmal atrial tachycardia) (HCC)     Rheumatoid arthritis (HCC)       Past Surgical History:   Procedure Laterality Date    HX APPENDECTOMY      spleen    HX HYSTERECTOMY      HX OOPHORECTOMY      HX SHOULDER ARTHROSCOPY      HX SPLENECTOMY       Allergies   Allergen Reactions    Levaquin [Levofloxacin] Nausea Only      Family History   Problem Relation Age of Onset    Breast Cancer Mother     Breast Cancer Sister     Diabetes Father     Heart Disease Father       Social History     Socioeconomic History    Marital status:      Spouse name: Not on file    Number of children: Not on file    Years of education: Not on file    Highest education level: Not on file   Occupational History    Not on file   Tobacco Use    Smoking status: Current Every Day Smoker     Packs/day: 1.00     Years: 30.00     Pack years: 30.00     Types: Cigarettes     Last attempt to quit: 2016     Years since quittin.0    Smokeless tobacco: Never Used   Substance and Sexual Activity    Alcohol use: Yes     Alcohol/week: 7.0 standard drinks     Types: 7 Standard drinks or equivalent per week    Drug use: No    Sexual activity: Not on file   Other Topics Concern    Not on file   Social History Narrative    Not on file     Social Determinants of Health     Financial Resource Strain:     Difficulty of Paying Living Expenses:    Food Insecurity:     Worried About Running Out of Food in the Last Year:     [address] in the Last Year:    Transportation Needs:     Lack of Transportation (Medical):  Lack of Transportation (Non-Medical):    Physical Activity:     Days of Exercise per Week:     Minutes of Exercise per Session:    Stress:     Feeling of Stress :    Social Connections:     Frequency of Communication with Friends and Family:     Frequency of Social Gatherings with Friends and Family:     Attends Religion Services:     Active Member of Clubs or Organizations:     Attends Club or Organization Meetings:     Marital Status:    Intimate Partner Violence:     Fear of Current or Ex-Partner:     Emotionally Abused:     Physically Abused:     Sexually Abused:       Current Outpatient Medications   Medication Sig    metoprolol succinate (TOPROL-XL) 25 mg XL tablet Take 1 tablet by mouth once daily    diclofenac (VOLTAREN) 1 % gel Apply  to affected area four (4) times daily.  ibuprofen 200 mg cap Take  by mouth as needed.  ergocalciferol (VITAMIN D2) 50,000 unit capsule Take 50,000 Units by mouth every seven (7) days.  sertraline (ZOLOFT) 50 mg tablet Take 1 Tab by mouth daily.  hydroxychloroquine (PLAQUENIL) 200 mg tablet Take 1 Tab by mouth daily.     diphenhydrAMINE (BENADRYL) 25 mg tablet Take 25 mg by mouth nightly. No current facility-administered medications for this visit. Review of Symptoms:    CONST  No weight change. No fever, chills, sweats    ENT No visual changes, URI sx, sore throat    CV  See HPI   RESP  No cough, or sputum, wheezing. Also see HPI   GI  No abdominal pain or change in bowel habits. No heartburn or dysphagia. No melena or rectal bleeding.   No dysuria, urgency, frequency, hematuria   MSKEL  No joint pain, swelling. No muscle pain. SKIN  No rash or lesions. NEURO  No headache, syncope, or seizure. No weakness, loss of sensation, or paresthesias. PSYCH  No low mood or depression  No anxiety. HE/LYMPH  No easy bruising, abnormal bleeding, or enlarged glands. Physical ExamPhysical Exam:    There were no vitals taken for this visit. Gen: NAD  HEENT:  PERRL, throat clear  Neck: no adenopathy, no thyromegaly, no JVD   Heart:  Regular,Nl S1S2,  no murmur, gallop or rub. Lungs:  clear  Abdomen:   Soft, non-tender, bowel sounds are active. Extremities:  No edema  Pulse: symmetric  Neuro: A&O times 3, No focal neuro deficits    Cardiographics    ECG: NSR, IRBBB      Assessment:         Patient Active Problem List    Diagnosis Date Noted    Palpitations 11/14/2017    PAT (paroxysmal [facility] tachycardia) ([facility] 11/14/2017    Rheumatoid arthritis with positive rheumatoid factor ([facility] [address] 02/24/2016    Osteopenia 02/24/2016    MARLY (generalized anxiety disorder) 02/24/2016    Smoker 02/24/2016     Seen in [facility] [address] Rocio Wooten with increased palpitations--started low dose metoprolol, 30 day Event monitor (only 3 days) with PAC\"s and short runs of atrial tachy (3-4 beats)--no afib seen. Had Stress Treadmill last summer (ok) and Echo 11/17 with LVEF 29-70, grade I diastolic, otherwise normal.  Palpitations improved, no prolonged episodes. . Labs 7/10/20 with normal CMP, chol 213, , HDL 52, , TSH normal.      Plan:     Doing well with no adverse cardiac symptoms. Lipids and labs followed by PCP. Continue current care and f/u in 6 months.     Gilma Davenport MD

## 2021-06-12 ENCOUNTER — APPOINTMENT (OUTPATIENT)
Dept: GENERAL RADIOLOGY | Age: 81
DRG: 065 | End: 2021-06-12
Attending: EMERGENCY MEDICINE
Payer: MEDICARE

## 2021-06-12 ENCOUNTER — ANESTHESIA (OUTPATIENT)
Dept: INTERVENTIONAL RADIOLOGY/VASCULAR | Age: 81
DRG: 024 | End: 2021-06-12
Payer: MEDICARE

## 2021-06-12 ENCOUNTER — HOSPITAL ENCOUNTER (INPATIENT)
Age: 81
LOS: 1 days | Discharge: ANOTHER HOSPITAL | DRG: 065 | End: 2021-06-12
Attending: EMERGENCY MEDICINE | Admitting: INTERNAL MEDICINE
Payer: MEDICARE

## 2021-06-12 ENCOUNTER — ANESTHESIA EVENT (OUTPATIENT)
Dept: INTERVENTIONAL RADIOLOGY/VASCULAR | Age: 81
DRG: 024 | End: 2021-06-12
Payer: MEDICARE

## 2021-06-12 ENCOUNTER — APPOINTMENT (OUTPATIENT)
Dept: CT IMAGING | Age: 81
DRG: 065 | End: 2021-06-12
Attending: EMERGENCY MEDICINE
Payer: MEDICARE

## 2021-06-12 ENCOUNTER — HOSPITAL ENCOUNTER (INPATIENT)
Age: 81
LOS: 3 days | Discharge: HOME | DRG: 024 | End: 2021-06-15
Attending: EMERGENCY MEDICINE | Admitting: INTERNAL MEDICINE
Payer: MEDICARE

## 2021-06-12 ENCOUNTER — HOSPITAL ENCOUNTER (INPATIENT)
Dept: INTERVENTIONAL RADIOLOGY/VASCULAR | Age: 81
Discharge: HOME | DRG: 024 | End: 2021-06-12
Attending: INTERNAL MEDICINE | Admitting: RADIOLOGY
Payer: MEDICARE

## 2021-06-12 VITALS
WEIGHT: 178.57 LBS | HEART RATE: 55 BPM | DIASTOLIC BLOOD PRESSURE: 48 MMHG | BODY MASS INDEX: 27.56 KG/M2 | SYSTOLIC BLOOD PRESSURE: 105 MMHG | OXYGEN SATURATION: 100 % | TEMPERATURE: 97.9 F | RESPIRATION RATE: 22 BRPM

## 2021-06-12 VITALS
SYSTOLIC BLOOD PRESSURE: 108 MMHG | OXYGEN SATURATION: 99 % | DIASTOLIC BLOOD PRESSURE: 63 MMHG | HEART RATE: 56 BPM | RESPIRATION RATE: 16 BRPM

## 2021-06-12 DIAGNOSIS — I63.9 ISCHEMIC STROKE (HCC): ICD-10-CM

## 2021-06-12 DIAGNOSIS — I63.9 ACUTE ISCHEMIC STROKE (HCC): Primary | ICD-10-CM

## 2021-06-12 DIAGNOSIS — I66.02 OCCLUSION OF LEFT MIDDLE CEREBRAL ARTERY: ICD-10-CM

## 2021-06-12 DIAGNOSIS — I63.512 ACUTE STROKE DUE TO OCCLUSION OF LEFT MIDDLE CEREBRAL ARTERY (HCC): Primary | ICD-10-CM

## 2021-06-12 PROBLEM — R53.1 RIGHT SIDED WEAKNESS: Status: ACTIVE | Noted: 2021-06-12

## 2021-06-12 LAB
ALBUMIN SERPL-MCNC: 3.3 G/DL (ref 3.5–5)
ALBUMIN/GLOB SERPL: 1 {RATIO} (ref 1.1–2.2)
ALP SERPL-CCNC: 98 U/L (ref 45–117)
ALT SERPL-CCNC: 32 U/L (ref 12–78)
ANION GAP SERPL CALC-SCNC: 10 MMOL/L (ref 5–15)
AST SERPL-CCNC: 23 U/L (ref 15–37)
BASOPHILS # BLD: 0.1 K/UL (ref 0–0.1)
BASOPHILS NFR BLD: 0 % (ref 0–1)
BILIRUB SERPL-MCNC: 0.3 MG/DL (ref 0.2–1)
BUN SERPL-MCNC: 15 MG/DL (ref 6–20)
BUN/CREAT SERPL: 17 (ref 12–20)
CALCIUM SERPL-MCNC: 9 MG/DL (ref 8.5–10.1)
CHLORIDE SERPL-SCNC: 103 MMOL/L (ref 97–108)
CO2 SERPL-SCNC: 26 MMOL/L (ref 21–32)
CREAT SERPL-MCNC: 0.87 MG/DL (ref 0.55–1.02)
DIFFERENTIAL METHOD BLD: ABNORMAL
EOSINOPHIL # BLD: 0.1 K/UL (ref 0–0.4)
EOSINOPHIL NFR BLD: 1 % (ref 0–7)
ERYTHROCYTE [DISTWIDTH] IN BLOOD BY AUTOMATED COUNT: 13.2 % (ref 11.5–14.5)
FLUAV RNA SPEC QL NAA+PROBE: NOT DETECTED
FLUBV RNA SPEC QL NAA+PROBE: NOT DETECTED
GLOBULIN SER CALC-MCNC: 3.4 G/DL (ref 2–4)
GLUCOSE BLD STRIP.AUTO-MCNC: 129 MG/DL (ref 65–117)
GLUCOSE SERPL-MCNC: 131 MG/DL (ref 65–100)
HCT VFR BLD AUTO: 42.1 % (ref 35–47)
HGB BLD-MCNC: 13.8 G/DL (ref 11.5–16)
IMM GRANULOCYTES # BLD AUTO: 0.1 K/UL (ref 0–0.04)
IMM GRANULOCYTES NFR BLD AUTO: 0 % (ref 0–0.5)
INR PPP: 1.1 (ref 0.9–1.1)
LYMPHOCYTES # BLD: 2.6 K/UL (ref 0.8–3.5)
LYMPHOCYTES NFR BLD: 20 % (ref 12–49)
MCH RBC QN AUTO: 32.2 PG (ref 26–34)
MCHC RBC AUTO-ENTMCNC: 32.8 G/DL (ref 30–36.5)
MCV RBC AUTO: 98.1 FL (ref 80–99)
MONOCYTES # BLD: 1 K/UL (ref 0–1)
MONOCYTES NFR BLD: 7 % (ref 5–13)
NEUTS SEG # BLD: 9.7 K/UL (ref 1.8–8)
NEUTS SEG NFR BLD: 72 % (ref 32–75)
NRBC # BLD: 0 K/UL (ref 0–0.01)
NRBC BLD-RTO: 0 PER 100 WBC
PLATELET # BLD AUTO: 277 K/UL (ref 150–400)
PMV BLD AUTO: 9.5 FL (ref 8.9–12.9)
POTASSIUM SERPL-SCNC: 4 MMOL/L (ref 3.5–5.1)
PROT SERPL-MCNC: 6.7 G/DL (ref 6.4–8.2)
PROTHROMBIN TIME: 10.6 SEC (ref 9–11.1)
RBC # BLD AUTO: 4.29 M/UL (ref 3.8–5.2)
SARS-COV-2, COV2: NOT DETECTED
SERVICE CMNT-IMP: ABNORMAL
SODIUM SERPL-SCNC: 139 MMOL/L (ref 136–145)
TROPONIN I SERPL-MCNC: <0.05 NG/ML
WBC # BLD AUTO: 13.6 K/UL (ref 3.6–11)

## 2021-06-12 PROCEDURE — 70450 CT HEAD/BRAIN W/O DYE: CPT

## 2021-06-12 PROCEDURE — 75810000275 HC EMERGENCY DEPT VISIT NO LEVEL OF CARE

## 2021-06-12 PROCEDURE — C1769 GUIDE WIRE: HCPCS

## 2021-06-12 PROCEDURE — 74011000250 HC RX REV CODE- 250: Performed by: RADIOLOGY

## 2021-06-12 PROCEDURE — 76060000033 HC ANESTHESIA 1 TO 1.5 HR

## 2021-06-12 PROCEDURE — 84484 ASSAY OF TROPONIN QUANT: CPT

## 2021-06-12 PROCEDURE — 03CG3ZZ EXTIRPATION OF MATTER FROM INTRACRANIAL ARTERY, PERCUTANEOUS APPROACH: ICD-10-PCS | Performed by: RADIOLOGY

## 2021-06-12 PROCEDURE — 74011000636 HC RX REV CODE- 636

## 2021-06-12 PROCEDURE — 70498 CT ANGIOGRAPHY NECK: CPT

## 2021-06-12 PROCEDURE — 74011000258 HC RX REV CODE- 258: Performed by: NURSE PRACTITIONER

## 2021-06-12 PROCEDURE — C1887 CATHETER, GUIDING: HCPCS

## 2021-06-12 PROCEDURE — 77030021532 HC CATH ANGI DX IMPRS MRTM -B

## 2021-06-12 PROCEDURE — 77030019940 HC BLNKT HYPOTHRM STRY -B

## 2021-06-12 PROCEDURE — C1894 INTRO/SHEATH, NON-LASER: HCPCS

## 2021-06-12 PROCEDURE — 85610 PROTHROMBIN TIME: CPT

## 2021-06-12 PROCEDURE — 77030012468 HC VLV BLEEDBK CNTRL ABBT -B

## 2021-06-12 PROCEDURE — 36224 PLACE CATH CAROTD ART: CPT

## 2021-06-12 PROCEDURE — 85025 COMPLETE CBC W/AUTO DIFF WBC: CPT

## 2021-06-12 PROCEDURE — B3141ZZ FLUOROSCOPY OF LEFT COMMON CAROTID ARTERY USING LOW OSMOLAR CONTRAST: ICD-10-PCS | Performed by: RADIOLOGY

## 2021-06-12 PROCEDURE — 74011250637 HC RX REV CODE- 250/637: Performed by: EMERGENCY MEDICINE

## 2021-06-12 PROCEDURE — 77030038563 HC TU ART PRESSR ICUM -Z

## 2021-06-12 PROCEDURE — 2709999900 HC NON-CHARGEABLE SUPPLY

## 2021-06-12 PROCEDURE — 80053 COMPREHEN METABOLIC PANEL: CPT

## 2021-06-12 PROCEDURE — 65270000029 HC RM PRIVATE

## 2021-06-12 PROCEDURE — 77030020268 HC MISC GENERAL SUPPLY

## 2021-06-12 PROCEDURE — 36415 COLL VENOUS BLD VENIPUNCTURE: CPT

## 2021-06-12 PROCEDURE — 74011250636 HC RX REV CODE- 250/636: Performed by: NURSE PRACTITIONER

## 2021-06-12 PROCEDURE — 74011000636 HC RX REV CODE- 636: Performed by: EMERGENCY MEDICINE

## 2021-06-12 PROCEDURE — 77030038614 HC TU HI FLW NV MITY -F

## 2021-06-12 PROCEDURE — B31R1ZZ FLUOROSCOPY OF INTRACRANIAL ARTERIES USING LOW OSMOLAR CONTRAST: ICD-10-PCS | Performed by: RADIOLOGY

## 2021-06-12 PROCEDURE — C1760 CLOSURE DEV, VASC: HCPCS

## 2021-06-12 PROCEDURE — 93005 ELECTROCARDIOGRAM TRACING: CPT

## 2021-06-12 PROCEDURE — 71045 X-RAY EXAM CHEST 1 VIEW: CPT

## 2021-06-12 PROCEDURE — 82962 GLUCOSE BLOOD TEST: CPT

## 2021-06-12 PROCEDURE — 65610000006 HC RM INTENSIVE CARE

## 2021-06-12 PROCEDURE — 74011000250 HC RX REV CODE- 250: Performed by: NURSE PRACTITIONER

## 2021-06-12 PROCEDURE — 87636 SARSCOV2 & INF A&B AMP PRB: CPT

## 2021-06-12 PROCEDURE — 99285 EMERGENCY DEPT VISIT HI MDM: CPT

## 2021-06-12 PROCEDURE — 76937 US GUIDE VASCULAR ACCESS: CPT

## 2021-06-12 PROCEDURE — 77030031515

## 2021-06-12 RX ORDER — GUAIFENESIN 100 MG/5ML
324 LIQUID (ML) ORAL
Status: COMPLETED | OUTPATIENT
Start: 2021-06-12 | End: 2021-06-12

## 2021-06-12 RX ORDER — SODIUM CHLORIDE, SODIUM LACTATE, POTASSIUM CHLORIDE, CALCIUM CHLORIDE 600; 310; 30; 20 MG/100ML; MG/100ML; MG/100ML; MG/100ML
125 INJECTION, SOLUTION INTRAVENOUS CONTINUOUS
Status: DISCONTINUED | OUTPATIENT
Start: 2021-06-12 | End: 2021-06-13 | Stop reason: HOSPADM

## 2021-06-12 RX ORDER — MIDAZOLAM HYDROCHLORIDE 1 MG/ML
0.5 INJECTION, SOLUTION INTRAMUSCULAR; INTRAVENOUS
Status: DISCONTINUED | OUTPATIENT
Start: 2021-06-12 | End: 2021-06-16 | Stop reason: HOSPADM

## 2021-06-12 RX ORDER — LIDOCAINE HYDROCHLORIDE 10 MG/ML
20 INJECTION INFILTRATION; PERINEURAL
Status: COMPLETED | OUTPATIENT
Start: 2021-06-12 | End: 2021-06-12

## 2021-06-12 RX ORDER — OXYCODONE AND ACETAMINOPHEN 5; 325 MG/1; MG/1
1 TABLET ORAL AS NEEDED
Status: DISCONTINUED | OUTPATIENT
Start: 2021-06-12 | End: 2021-06-16 | Stop reason: HOSPADM

## 2021-06-12 RX ORDER — SODIUM CHLORIDE, SODIUM LACTATE, POTASSIUM CHLORIDE, CALCIUM CHLORIDE 600; 310; 30; 20 MG/100ML; MG/100ML; MG/100ML; MG/100ML
INJECTION, SOLUTION INTRAVENOUS
Status: DISCONTINUED | OUTPATIENT
Start: 2021-06-12 | End: 2021-06-12 | Stop reason: HOSPADM

## 2021-06-12 RX ORDER — MIDAZOLAM HYDROCHLORIDE 1 MG/ML
1 INJECTION, SOLUTION INTRAMUSCULAR; INTRAVENOUS AS NEEDED
Status: DISCONTINUED | OUTPATIENT
Start: 2021-06-12 | End: 2021-06-16 | Stop reason: HOSPADM

## 2021-06-12 RX ORDER — HEPARIN SODIUM 1000 [USP'U]/ML
10000 INJECTION, SOLUTION INTRAVENOUS; SUBCUTANEOUS
Status: DISCONTINUED | OUTPATIENT
Start: 2021-06-12 | End: 2021-06-12

## 2021-06-12 RX ORDER — HEPARIN SODIUM 1000 [USP'U]/ML
INJECTION, SOLUTION INTRAVENOUS; SUBCUTANEOUS AS NEEDED
Status: DISCONTINUED | OUTPATIENT
Start: 2021-06-12 | End: 2021-06-12 | Stop reason: HOSPADM

## 2021-06-12 RX ORDER — SODIUM CHLORIDE 0.9 % (FLUSH) 0.9 %
5-40 SYRINGE (ML) INJECTION AS NEEDED
Status: DISCONTINUED | OUTPATIENT
Start: 2021-06-12 | End: 2021-06-16 | Stop reason: HOSPADM

## 2021-06-12 RX ORDER — FENTANYL CITRATE 50 UG/ML
25 INJECTION, SOLUTION INTRAMUSCULAR; INTRAVENOUS
Status: DISCONTINUED | OUTPATIENT
Start: 2021-06-12 | End: 2021-06-16 | Stop reason: HOSPADM

## 2021-06-12 RX ORDER — ACETAMINOPHEN 325 MG/1
650 TABLET ORAL ONCE
Status: DISCONTINUED | OUTPATIENT
Start: 2021-06-12 | End: 2021-06-13 | Stop reason: HOSPADM

## 2021-06-12 RX ORDER — HYDROMORPHONE HYDROCHLORIDE 1 MG/ML
0.2 INJECTION, SOLUTION INTRAMUSCULAR; INTRAVENOUS; SUBCUTANEOUS
Status: DISCONTINUED | OUTPATIENT
Start: 2021-06-12 | End: 2021-06-16 | Stop reason: HOSPADM

## 2021-06-12 RX ORDER — LIDOCAINE HYDROCHLORIDE AND EPINEPHRINE 10; 10 MG/ML; UG/ML
20 INJECTION, SOLUTION INFILTRATION; PERINEURAL
Status: COMPLETED | OUTPATIENT
Start: 2021-06-12 | End: 2021-06-12

## 2021-06-12 RX ORDER — POLYETHYLENE GLYCOL 3350 17 G/17G
17 POWDER, FOR SOLUTION ORAL DAILY PRN
Status: DISCONTINUED | OUTPATIENT
Start: 2021-06-12 | End: 2021-06-15 | Stop reason: HOSPADM

## 2021-06-12 RX ORDER — SODIUM CHLORIDE 9 MG/ML
25 INJECTION, SOLUTION INTRAVENOUS CONTINUOUS
Status: DISCONTINUED | OUTPATIENT
Start: 2021-06-12 | End: 2021-06-13 | Stop reason: HOSPADM

## 2021-06-12 RX ORDER — SODIUM CHLORIDE, SODIUM LACTATE, POTASSIUM CHLORIDE, CALCIUM CHLORIDE 600; 310; 30; 20 MG/100ML; MG/100ML; MG/100ML; MG/100ML
125 INJECTION, SOLUTION INTRAVENOUS CONTINUOUS
Status: DISCONTINUED | OUTPATIENT
Start: 2021-06-12 | End: 2021-06-16 | Stop reason: HOSPADM

## 2021-06-12 RX ORDER — SODIUM CHLORIDE 0.9 % (FLUSH) 0.9 %
5-40 SYRINGE (ML) INJECTION EVERY 8 HOURS
Status: DISCONTINUED | OUTPATIENT
Start: 2021-06-13 | End: 2021-06-14

## 2021-06-12 RX ORDER — LIDOCAINE HYDROCHLORIDE 10 MG/ML
0.1 INJECTION, SOLUTION EPIDURAL; INFILTRATION; INTRACAUDAL; PERINEURAL AS NEEDED
Status: DISCONTINUED | OUTPATIENT
Start: 2021-06-12 | End: 2021-06-16 | Stop reason: HOSPADM

## 2021-06-12 RX ORDER — SODIUM CHLORIDE 0.9 % (FLUSH) 0.9 %
5-40 SYRINGE (ML) INJECTION AS NEEDED
Status: DISCONTINUED | OUTPATIENT
Start: 2021-06-12 | End: 2021-06-14

## 2021-06-12 RX ORDER — ONDANSETRON 2 MG/ML
4 INJECTION INTRAMUSCULAR; INTRAVENOUS AS NEEDED
Status: DISCONTINUED | OUTPATIENT
Start: 2021-06-12 | End: 2021-06-16 | Stop reason: HOSPADM

## 2021-06-12 RX ORDER — LIDOCAINE HYDROCHLORIDE 20 MG/ML
INJECTION, SOLUTION EPIDURAL; INFILTRATION; INTRACAUDAL; PERINEURAL AS NEEDED
Status: DISCONTINUED | OUTPATIENT
Start: 2021-06-12 | End: 2021-06-12 | Stop reason: HOSPADM

## 2021-06-12 RX ORDER — ONDANSETRON 4 MG/1
4 TABLET, ORALLY DISINTEGRATING ORAL
Status: DISCONTINUED | OUTPATIENT
Start: 2021-06-12 | End: 2021-06-15 | Stop reason: HOSPADM

## 2021-06-12 RX ORDER — SODIUM CHLORIDE 0.9 % (FLUSH) 0.9 %
5-40 SYRINGE (ML) INJECTION EVERY 8 HOURS
Status: DISCONTINUED | OUTPATIENT
Start: 2021-06-12 | End: 2021-06-16 | Stop reason: HOSPADM

## 2021-06-12 RX ORDER — HYDROXYCHLOROQUINE SULFATE 200 MG/1
200 TABLET, FILM COATED ORAL DAILY
Status: DISCONTINUED | OUTPATIENT
Start: 2021-06-13 | End: 2021-06-15 | Stop reason: HOSPADM

## 2021-06-12 RX ORDER — SERTRALINE HYDROCHLORIDE 50 MG/1
50 TABLET, FILM COATED ORAL DAILY
Status: DISCONTINUED | OUTPATIENT
Start: 2021-06-13 | End: 2021-06-15 | Stop reason: HOSPADM

## 2021-06-12 RX ORDER — PROTAMINE SULFATE 10 MG/ML
50 INJECTION, SOLUTION INTRAVENOUS
Status: DISCONTINUED | OUTPATIENT
Start: 2021-06-12 | End: 2021-06-12

## 2021-06-12 RX ORDER — SODIUM CHLORIDE 0.9 % (FLUSH) 0.9 %
5-40 SYRINGE (ML) INJECTION EVERY 8 HOURS
Status: DISCONTINUED | OUTPATIENT
Start: 2021-06-12 | End: 2021-06-15 | Stop reason: HOSPADM

## 2021-06-12 RX ORDER — SODIUM CHLORIDE 0.9 % (FLUSH) 0.9 %
5-40 SYRINGE (ML) INJECTION AS NEEDED
Status: DISCONTINUED | OUTPATIENT
Start: 2021-06-12 | End: 2021-06-15 | Stop reason: HOSPADM

## 2021-06-12 RX ORDER — MORPHINE SULFATE 2 MG/ML
2 INJECTION, SOLUTION INTRAMUSCULAR; INTRAVENOUS
Status: DISCONTINUED | OUTPATIENT
Start: 2021-06-12 | End: 2021-06-16 | Stop reason: HOSPADM

## 2021-06-12 RX ORDER — PROPOFOL 10 MG/ML
INJECTION, EMULSION INTRAVENOUS AS NEEDED
Status: DISCONTINUED | OUTPATIENT
Start: 2021-06-12 | End: 2021-06-12 | Stop reason: HOSPADM

## 2021-06-12 RX ORDER — ACETAMINOPHEN 325 MG/1
650 TABLET ORAL
Status: DISCONTINUED | OUTPATIENT
Start: 2021-06-12 | End: 2021-06-12 | Stop reason: HOSPADM

## 2021-06-12 RX ORDER — ACETAMINOPHEN 650 MG/1
650 SUPPOSITORY RECTAL
Status: DISCONTINUED | OUTPATIENT
Start: 2021-06-12 | End: 2021-06-15 | Stop reason: HOSPADM

## 2021-06-12 RX ORDER — DIPHENHYDRAMINE HYDROCHLORIDE 50 MG/ML
12.5 INJECTION, SOLUTION INTRAMUSCULAR; INTRAVENOUS AS NEEDED
Status: ACTIVE | OUTPATIENT
Start: 2021-06-12 | End: 2021-06-12

## 2021-06-12 RX ORDER — ONDANSETRON 2 MG/ML
4 INJECTION INTRAMUSCULAR; INTRAVENOUS
Status: DISCONTINUED | OUTPATIENT
Start: 2021-06-12 | End: 2021-06-12 | Stop reason: HOSPADM

## 2021-06-12 RX ORDER — DEXMEDETOMIDINE HYDROCHLORIDE 100 UG/ML
INJECTION, SOLUTION INTRAVENOUS AS NEEDED
Status: DISCONTINUED | OUTPATIENT
Start: 2021-06-12 | End: 2021-06-12 | Stop reason: HOSPADM

## 2021-06-12 RX ORDER — METOPROLOL SUCCINATE 25 MG/1
25 TABLET, EXTENDED RELEASE ORAL DAILY
Status: DISCONTINUED | OUTPATIENT
Start: 2021-06-13 | End: 2021-06-15 | Stop reason: HOSPADM

## 2021-06-12 RX ORDER — ACETAMINOPHEN 325 MG/1
650 TABLET ORAL
Status: DISCONTINUED | OUTPATIENT
Start: 2021-06-12 | End: 2021-06-15 | Stop reason: HOSPADM

## 2021-06-12 RX ORDER — ONDANSETRON 2 MG/ML
4 INJECTION INTRAMUSCULAR; INTRAVENOUS
Status: DISCONTINUED | OUTPATIENT
Start: 2021-06-12 | End: 2021-06-15 | Stop reason: HOSPADM

## 2021-06-12 RX ORDER — SODIUM CHLORIDE, SODIUM LACTATE, POTASSIUM CHLORIDE, CALCIUM CHLORIDE 600; 310; 30; 20 MG/100ML; MG/100ML; MG/100ML; MG/100ML
50 INJECTION, SOLUTION INTRAVENOUS CONTINUOUS
Status: DISPENSED | OUTPATIENT
Start: 2021-06-13 | End: 2021-06-13

## 2021-06-12 RX ORDER — ONDANSETRON 2 MG/ML
INJECTION INTRAMUSCULAR; INTRAVENOUS AS NEEDED
Status: DISCONTINUED | OUTPATIENT
Start: 2021-06-12 | End: 2021-06-12 | Stop reason: HOSPADM

## 2021-06-12 RX ORDER — VERAPAMIL HYDROCHLORIDE 2.5 MG/ML
5 INJECTION, SOLUTION INTRAVENOUS
Status: DISCONTINUED | OUTPATIENT
Start: 2021-06-12 | End: 2021-06-12

## 2021-06-12 RX ORDER — PROPOFOL 10 MG/ML
INJECTION, EMULSION INTRAVENOUS
Status: DISCONTINUED | OUTPATIENT
Start: 2021-06-12 | End: 2021-06-12 | Stop reason: HOSPADM

## 2021-06-12 RX ORDER — FENTANYL CITRATE 50 UG/ML
50 INJECTION, SOLUTION INTRAMUSCULAR; INTRAVENOUS AS NEEDED
Status: DISCONTINUED | OUTPATIENT
Start: 2021-06-12 | End: 2021-06-16 | Stop reason: HOSPADM

## 2021-06-12 RX ADMIN — DEXMEDETOMIDINE HYDROCHLORIDE 8 MCG: 100 INJECTION, SOLUTION, CONCENTRATE INTRAVENOUS at 21:27

## 2021-06-12 RX ADMIN — ASPIRIN 81 MG CHEWABLE TABLET 324 MG: 81 TABLET CHEWABLE at 19:34

## 2021-06-12 RX ADMIN — Medication 10 ML: at 22:30

## 2021-06-12 RX ADMIN — LIDOCAINE HYDROCHLORIDE 40 MG: 20 INJECTION, SOLUTION EPIDURAL; INFILTRATION; INTRACAUDAL; PERINEURAL at 21:27

## 2021-06-12 RX ADMIN — LIDOCAINE HYDROCHLORIDE 20 ML: 10 INJECTION, SOLUTION INFILTRATION; PERINEURAL at 21:00

## 2021-06-12 RX ADMIN — HEPARIN SODIUM 2000 UNITS: 1000 INJECTION, SOLUTION INTRAVENOUS; SUBCUTANEOUS at 21:40

## 2021-06-12 RX ADMIN — IOPAMIDOL 87 ML: 612 INJECTION, SOLUTION INTRAVENOUS at 22:00

## 2021-06-12 RX ADMIN — SODIUM CHLORIDE, SODIUM LACTATE, POTASSIUM CHLORIDE, AND CALCIUM CHLORIDE: 600; 310; 30; 20 INJECTION, SOLUTION INTRAVENOUS at 21:21

## 2021-06-12 RX ADMIN — IOPAMIDOL 100 ML: 755 INJECTION, SOLUTION INTRAVENOUS at 18:29

## 2021-06-12 RX ADMIN — DEXMEDETOMIDINE HYDROCHLORIDE 8 MCG: 100 INJECTION, SOLUTION, CONCENTRATE INTRAVENOUS at 21:35

## 2021-06-12 RX ADMIN — DEXMEDETOMIDINE HYDROCHLORIDE 8 MCG: 100 INJECTION, SOLUTION, CONCENTRATE INTRAVENOUS at 21:31

## 2021-06-12 RX ADMIN — ONDANSETRON HYDROCHLORIDE 4 MG: 2 INJECTION, SOLUTION INTRAMUSCULAR; INTRAVENOUS at 21:27

## 2021-06-12 RX ADMIN — PROPOFOL 75 MCG/KG/MIN: 10 INJECTION, EMULSION INTRAVENOUS at 21:27

## 2021-06-12 RX ADMIN — DEXTROSE 5 MG/HR: 5 SOLUTION INTRAVENOUS at 21:51

## 2021-06-12 RX ADMIN — PROPOFOL 80 MG: 10 INJECTION, EMULSION INTRAVENOUS at 21:27

## 2021-06-12 RX ADMIN — LIDOCAINE HYDROCHLORIDE,EPINEPHRINE BITARTRATE 200 MG: 10; .01 INJECTION, SOLUTION INFILTRATION; PERINEURAL at 21:00

## 2021-06-12 NOTE — PROGRESS NOTES
Contacted [employer] to arrange flight of patient to [address]. Spoke with Zander Ferguson. Discussed patient condition, including acute stroke with planned intervention. ETA is 2020. ED is aware.

## 2021-06-12 NOTE — ED PROVIDER NOTES
[facility]  EMERGENCY DEPARTMENT HISTORY AND PHYSICAL EXAM         Date of Service: 2021   Patient Name: Kale Bland   YOB: 1940  Medical Record Number: [id number]    History of Presenting Illness     Chief Complaint   Patient presents with    Fall        History Provided By:  patient    Additional History:   Kale Bland is a 68 y.o. female who presents via EMS to the ED with cc of right side weakness and numbness after she fell from a chair while working at her computer about 45 minutes PTA. Denies any injury. She was unable to get up due to weakness, mostly on the right. She got to her cell phone and called EMS. She feels her sx have improved somewhat since onset, though still havng some numbness and weakness on the right. No headache, facial droop, speech difficulty. No previous H/O stroke. There are no other complaints, changes or physical findings at this time. Primary Care Provider: Daniel Pascual MD   Specialist:    Past History     Past Medical History:   Past Medical History:   Diagnosis Date    Menopause     Osteopenia 2015    PAC (premature atrial contraction)     PAT (paroxysmal atrial tachycardia) (HCC)     Rheumatoid arthritis (HCC)         Past Surgical History:   Past Surgical History:   Procedure Laterality Date    HX APPENDECTOMY      spleen    HX HYSTERECTOMY      HX OOPHORECTOMY      HX SHOULDER ARTHROSCOPY      HX SPLENECTOMY          Family History:   Family History   Problem Relation Age of Onset    Breast Cancer Mother     Breast Cancer Sister     Diabetes Father     Heart Disease Father         Social History:   Social History     Tobacco Use    Smoking status: Current Every Day Smoker     Packs/day: 1.00     Years: 30.00     Pack years: 30.00     Types: Cigarettes     Last attempt to quit: 2016     Years since quittin.1    Smokeless tobacco: Never Used   Substance Use Topics    Alcohol use:  Yes Alcohol/week: 7.0 standard drinks     Types: 7 Standard drinks or equivalent per week    Drug use: No        Allergies: Allergies   Allergen Reactions    Levaquin [Levofloxacin] Nausea Only        Review of Systems   Review of Systems   Constitutional: Negative for appetite change, chills and fever. HENT: Negative for congestion. Eyes: Negative for visual disturbance. Respiratory: Negative for cough, shortness of breath and wheezing. Cardiovascular: Negative for chest pain, palpitations and leg swelling. Gastrointestinal: Negative for abdominal pain. Genitourinary: Negative for dysuria, frequency and urgency. Musculoskeletal: Negative for back pain, joint swelling, myalgias and neck stiffness. Skin: Negative for rash. Neurological: Positive for weakness and numbness. Negative for dizziness, syncope and headaches. Hematological: Negative for adenopathy. Psychiatric/Behavioral: Negative for behavioral problems and dysphoric mood. Physical Exam  Physical Exam  Vitals and nursing note reviewed. Constitutional:       General: She is not in acute distress. Appearance: She is well-developed. HENT:      Head: Normocephalic and atraumatic. Eyes:      General: No scleral icterus. Conjunctiva/sclera: Conjunctivae normal.      Pupils: Pupils are equal, round, and reactive to light. Cardiovascular:      Rate and Rhythm: Normal rate and regular rhythm. Heart sounds: No murmur heard. No gallop. Pulmonary:      Effort: Pulmonary effort is normal. No respiratory distress. Breath sounds: No stridor. No wheezing or rales. Abdominal:      General: Bowel sounds are normal. There is no distension. Palpations: Abdomen is soft. There is no mass. Tenderness: There is no abdominal tenderness. There is no guarding or rebound. Musculoskeletal:         General: Normal range of motion. Cervical back: Normal range of motion and neck supple.    Lymphadenopathy: Cervical: No cervical adenopathy. Skin:     General: Skin is warm and dry. Findings: No erythema or rash. Neurological:      Mental Status: She is alert and oriented to person, place, and time. Cranial Nerves: No cranial nerve deficit. Coordination: Coordination normal.      Comments: 4/5 strength right leg, 5/5 onleft. 4+/5 RUE, 5/5 left. Normal cerebellar signs. Medical Decision Making   I am the first provider for this patient. I reviewed the vital signs, available nursing notes, past medical history, past surgical history, family history and social history. Old Medical Records: EMS report, Cardiology notes     Provider Notes:   DDX: CVA, TIA, ICH     ED Course:  6:18 PM   Initial assessment performed. The patients presenting problems have been discussed, and they are in agreement with the care plan formulated and outlined with them. I have encouraged them to ask questions as they arise throughout their visit. Progress Notes:  6:24 PM  Possible resolving CVA or TIA. Calling Code Stroke. Eric Han MD      6:24 PM  Eric Han MD spoke with Dr. Zina Mcguire, Consult for Neurology. Discussed available diagnostic tests and clinical findings. He is in agreement with care plans as outlined. He/she will consult. 6:59 PM  Seen by Neuro. Not a tPA candidate, but recommend admission for stroke workup. Eric Han MD      7:00 PM  Eric Han MD spoke with Dr. Carole Montes, Consult for Hospitalist. Discussed available diagnostic tests and clinical findings. He is in agreement with care plans as outlined. He/she agrees with admission plan. 7:45pm Dr. Jacque Stoner calls and tells me that patient has an M1 occlusion on her CT angio. He would like to take her to the angio lab for intervention. Patient had been deemed not a TPA candidate because of improvement in her symptoms.   I will contact teleneurology discussed TPA and am arranging transfer via helicopter to [facility]. Rebeka's  7:56 pm discussed with Dr. Tony Lo who accepts patient in transfer to [address] [place] [address] ED.  805pm discussed with Dr. Collin Noe, neurologist, who reviewed patient's chart from earlier [facility] and states that he feels a TPA would not be indicated in this patient with a low stroke score who is going to the Cath Lab this was discussed in detail with the patient and she is in agreement with no TPA. Patient understands that she will be going to the [address] [place] [address] to be seen by Dr. Shanda Wallace and possibly have angio and treatment for her proximal M1 occlusion.     Procedures:   Critical Care  Performed by: Gia العلي MD  Authorized by: Gia العلي MD     Critical care provider statement:     Critical care time (minutes):  45    Critical care time was exclusive of:  Separately billable procedures and treating other patients and teaching time    Critical care was necessary to treat or prevent imminent or life-threatening deterioration of the following conditions:  CNS failure or compromise    Critical care was time spent personally by me on the following activities:  Development of treatment plan with patient or surrogate, discussions with consultants, evaluation of patient's response to treatment, examination of patient, ordering and performing treatments and interventions, ordering and review of laboratory studies, ordering and review of radiographic studies and re-evaluation of patient's condition    I assumed direction of critical care for this patient from another provider in my specialty: yes          Diagnostic Study Results   Labs -      Recent Results (from the past 12 hour(s))   GLUCOSE, POC    Collection Time: 06/12/21  6:19 PM   Result Value Ref Range    Glucose (POC) 129 (H) 65 - 117 mg/dL    Performed by Laron De Paz    CBC WITH AUTOMATED DIFF    Collection Time: 06/12/21  6:33 PM   Result Value Ref Range    WBC 13.6 (H) 3.6 - 11.0 K/uL    RBC 4.29 3.80 - 5.20 M/uL    HGB 13.8 11.5 - 16.0 g/dL    HCT 42.1 35.0 - 47.0 %    MCV 98.1 80.0 - 99.0 FL    MCH 32.2 26.0 - 34.0 PG    MCHC 32.8 30.0 - 36.5 g/dL    RDW 13.2 11.5 - 14.5 %    PLATELET 627 031 - 328 K/uL    MPV 9.5 8.9 - 12.9 FL    NRBC 0.0 0  WBC    ABSOLUTE NRBC 0.00 0.00 - 0.01 K/uL    NEUTROPHILS 72 32 - 75 %    LYMPHOCYTES 20 12 - 49 %    MONOCYTES 7 5 - 13 %    EOSINOPHILS 1 0 - 7 %    BASOPHILS 0 0 - 1 %    IMMATURE GRANULOCYTES 0 0.0 - 0.5 %    ABS. NEUTROPHILS 9.7 (H) 1.8 - 8.0 K/UL    ABS. LYMPHOCYTES 2.6 0.8 - 3.5 K/UL    ABS. MONOCYTES 1.0 0.0 - 1.0 K/UL    ABS. EOSINOPHILS 0.1 0.0 - 0.4 K/UL    ABS. BASOPHILS 0.1 0.0 - 0.1 K/UL    ABS. IMM. GRANS. 0.1 (H) 0.00 - 0.04 K/UL    DF AUTOMATED     METABOLIC PANEL, COMPREHENSIVE    Collection Time: 06/12/21  6:33 PM   Result Value Ref Range    Sodium 139 136 - 145 mmol/L    Potassium 4.0 3.5 - 5.1 mmol/L    Chloride 103 97 - 108 mmol/L    CO2 26 21 - 32 mmol/L    Anion gap 10 5 - 15 mmol/L    Glucose 131 (H) 65 - 100 mg/dL    BUN 15 6 - 20 MG/DL    Creatinine 0.87 0.55 - 1.02 MG/DL    BUN/Creatinine ratio 17 12 - 20      GFR est AA >60 >60 ml/min/1.73m2    GFR est non-AA >60 >60 ml/min/1.73m2    Calcium 9.0 8.5 - 10.1 MG/DL    Bilirubin, total PENDING MG/DL    ALT (SGPT) PENDING U/L    AST (SGOT) PENDING U/L    Alk.  phosphatase PENDING U/L    Protein, total PENDING g/dL    Albumin PENDING g/dL    Globulin PENDING g/dL    A-G Ratio PENDING     PROTHROMBIN TIME + INR    Collection Time: 06/12/21  6:33 PM   Result Value Ref Range    INR 1.1 0.9 - 1.1      Prothrombin time 10.6 9.0 - 11.1 sec   EKG, 12 LEAD, INITIAL    Collection Time: 06/12/21  6:54 PM   Result Value Ref Range    Ventricular Rate 53 BPM    Atrial Rate 53 BPM    P-R Interval 142 ms    QRS Duration 94 ms    Q-T Interval 460 ms    QTC Calculation (Bezet) 431 ms    Calculated P Axis -11 degrees    Calculated R Axis 57 degrees    Calculated T Axis 41 degrees    Diagnosis       Sinus bradycardia  Otherwise normal ECG  No previous ECGs available         Radiologic Studies -  The following have been ordered and reviewed:  CT CODE NEURO HEAD WO CONTRAST   Final Result   No acute intracranial hemorrhage, mass or infarct. CTA CODE NEURO HEAD AND NECK W CONT    (Results Pending)   XR CHEST PORT    (Results Pending)     CT Results  (Last 48 hours)               06/12/21 1827  CT CODE NEURO HEAD WO CONTRAST Final result    Impression:  No acute intracranial hemorrhage, mass or infarct. Narrative:  EXAM: Brain CT without contrast.       INDICATION: Code Stroke        TECHNIQUE: Noncontrast CT of the brain is performed with 5 mm collimation. Bone   algorithm axial and sagittal and coronal reconstructions performed and   evaluated. CT dose reduction was achieved through use of a standardized   protocol tailored for this examination and automatic exposure control for dose   modulation. COMPARISON: None       FINDINGS: There is no acute intracranial hemorrhage, mass, mass effect or   herniation. Ventricular system is normal. The gray-white matter differentiation   is well-preserved. The mastoid air cells are well pneumatized. The visualized   paranasal sinuses are normal.               CXR Results  (Last 48 hours)    None            Vital Signs-Reviewed the patient's vital signs. Patient Vitals for the past 12 hrs:   Temp Pulse Resp BP SpO2   06/12/21 1851  (!) 51 14 (!) 163/49 99 %   06/12/21 1848  (!) 52 16 (!) 143/79    06/12/21 1823    (!) 149/71    06/12/21 1809 97.9 °F (36.6 °C) (!) 50 14  100 %       EKG interpretation: (Preliminary)  Rhythm: sinus bradycardia; and regular . Rate (approx.): 53; Axis: normal; VT interval: normal; QRS interval: normal ; ST/T wave: normal; Other findings: normal.    Medications Given in the ED:  Medications   iopamidoL (ISOVUE-370) 76 % injection 100 mL (has no administration in time range)       Diagnosis:  Clinical Impression:   1.  Right sided weakness Disposition:  Admit  _______________________________   Attestations: This note was performed by Yanci Navarro MD in its entirety.   _______________________________

## 2021-06-12 NOTE — ED TRIAGE NOTES
Became dizzy and fell from chair onto carpeted floor.  No LOC, right elbow pain, axox4 on arrival via rescue

## 2021-06-12 NOTE — ED NOTES
TRANSFER - OUT REPORT:    Verbal report given to Jeannie Lazaro RN(name) on Lindsay Beth  being transferred to [facility] ED(unit) for routine progression of care       Report consisted of patients Situation, Background, Assessment and   Recommendations(SBAR). Information from the following report(s) SBAR, Kardex and ED Summary was reviewed with the receiving nurse. Lines:   Peripheral IV 06/12/21 Left Antecubital (Active)   Site Assessment Clean, dry, & intact 06/12/21 1837   Phlebitis Assessment 0 06/12/21 1837   Infiltration Assessment 0 06/12/21 1837   Dressing Status Clean, dry, & intact 06/12/21 1837   Dressing Type Transparent 06/12/21 1837   Hub Color/Line Status Pink;Capped;Flushed 06/12/21 1837        Opportunity for questions and clarification was provided.       Patient transported with:  Maura

## 2021-06-12 NOTE — Clinical Note
Status[de-identified] INPATIENT [101]   Type of Bed: Telemetry [19]   Cardiac Monitoring Required?: No   Inpatient Hospitalization Certified Necessary for the Following Reasons: 3.  Patient receiving treatment that can only be provided in an inpatient setting (further clarification in H&P documentation)   Admitting Diagnosis: Right sided weakness [1622430]   Admitting Physician: Belen Pickens [[id number]]   Attending Physician: Belen Pickens [[id number]]   Estimated Length of Stay: < 96 Hours   Discharge Plan[de-identified] Home with Office Follow-up

## 2021-06-13 ENCOUNTER — APPOINTMENT (OUTPATIENT)
Dept: CT IMAGING | Age: 81
DRG: 024 | End: 2021-06-13
Attending: NURSE PRACTITIONER
Payer: MEDICARE

## 2021-06-13 ENCOUNTER — APPOINTMENT (OUTPATIENT)
Dept: MRI IMAGING | Age: 81
DRG: 024 | End: 2021-06-13
Attending: NURSE PRACTITIONER
Payer: MEDICARE

## 2021-06-13 LAB
ANION GAP SERPL CALC-SCNC: 5 MMOL/L (ref 5–15)
APTT PPP: 21.7 SEC (ref 22.1–31)
ATRIAL RATE: 53 BPM
BUN SERPL-MCNC: 11 MG/DL (ref 6–20)
BUN/CREAT SERPL: 18 (ref 12–20)
CALCIUM SERPL-MCNC: 8.8 MG/DL (ref 8.5–10.1)
CALCULATED P AXIS, ECG09: -11 DEGREES
CALCULATED R AXIS, ECG10: 57 DEGREES
CALCULATED T AXIS, ECG11: 41 DEGREES
CHLORIDE SERPL-SCNC: 109 MMOL/L (ref 97–108)
CHOLEST SERPL-MCNC: 171 MG/DL
CO2 SERPL-SCNC: 26 MMOL/L (ref 21–32)
CREAT SERPL-MCNC: 0.61 MG/DL (ref 0.55–1.02)
DIAGNOSIS, 93000: NORMAL
ERYTHROCYTE [DISTWIDTH] IN BLOOD BY AUTOMATED COUNT: 13.3 % (ref 11.5–14.5)
EST. AVERAGE GLUCOSE BLD GHB EST-MCNC: 123 MG/DL
GLUCOSE SERPL-MCNC: 108 MG/DL (ref 65–100)
HBA1C MFR BLD: 5.9 % (ref 4–5.6)
HCT VFR BLD AUTO: 41.7 % (ref 35–47)
HDLC SERPL-MCNC: 56 MG/DL
HDLC SERPL: 3.1 {RATIO} (ref 0–5)
HGB BLD-MCNC: 13.4 G/DL (ref 11.5–16)
INR PPP: 1 (ref 0.9–1.1)
LDLC SERPL CALC-MCNC: 87.8 MG/DL (ref 0–100)
MAGNESIUM SERPL-MCNC: 2.2 MG/DL (ref 1.6–2.4)
MCH RBC QN AUTO: 31.4 PG (ref 26–34)
MCHC RBC AUTO-ENTMCNC: 32.1 G/DL (ref 30–36.5)
MCV RBC AUTO: 97.7 FL (ref 80–99)
NRBC # BLD: 0 K/UL (ref 0–0.01)
NRBC BLD-RTO: 0 PER 100 WBC
P-R INTERVAL, ECG05: 142 MS
PHOSPHATE SERPL-MCNC: 3.3 MG/DL (ref 2.6–4.7)
PLATELET # BLD AUTO: 270 K/UL (ref 150–400)
PMV BLD AUTO: 9.8 FL (ref 8.9–12.9)
POTASSIUM SERPL-SCNC: 3.9 MMOL/L (ref 3.5–5.1)
PROTHROMBIN TIME: 10.7 SEC (ref 9–11.1)
Q-T INTERVAL, ECG07: 460 MS
QRS DURATION, ECG06: 94 MS
QTC CALCULATION (BEZET), ECG08: 431 MS
RBC # BLD AUTO: 4.27 M/UL (ref 3.8–5.2)
SODIUM SERPL-SCNC: 140 MMOL/L (ref 136–145)
THERAPEUTIC RANGE,PTTT: ABNORMAL SECS (ref 58–77)
TRIGL SERPL-MCNC: 136 MG/DL (ref ?–150)
VENTRICULAR RATE, ECG03: 53 BPM
VLDLC SERPL CALC-MCNC: 27.2 MG/DL
WBC # BLD AUTO: 13.8 K/UL (ref 3.6–11)

## 2021-06-13 PROCEDURE — 99223 1ST HOSP IP/OBS HIGH 75: CPT | Performed by: PSYCHIATRY & NEUROLOGY

## 2021-06-13 PROCEDURE — 97116 GAIT TRAINING THERAPY: CPT

## 2021-06-13 PROCEDURE — 99231 SBSQ HOSP IP/OBS SF/LOW 25: CPT | Performed by: RADIOLOGY

## 2021-06-13 PROCEDURE — 85610 PROTHROMBIN TIME: CPT

## 2021-06-13 PROCEDURE — 97161 PT EVAL LOW COMPLEX 20 MIN: CPT

## 2021-06-13 PROCEDURE — 70450 CT HEAD/BRAIN W/O DYE: CPT

## 2021-06-13 PROCEDURE — 85730 THROMBOPLASTIN TIME PARTIAL: CPT

## 2021-06-13 PROCEDURE — 97165 OT EVAL LOW COMPLEX 30 MIN: CPT

## 2021-06-13 PROCEDURE — 74011250637 HC RX REV CODE- 250/637: Performed by: PSYCHIATRY & NEUROLOGY

## 2021-06-13 PROCEDURE — 94760 N-INVAS EAR/PLS OXIMETRY 1: CPT

## 2021-06-13 PROCEDURE — 83036 HEMOGLOBIN GLYCOSYLATED A1C: CPT

## 2021-06-13 PROCEDURE — 74011250637 HC RX REV CODE- 250/637: Performed by: NURSE PRACTITIONER

## 2021-06-13 PROCEDURE — 80061 LIPID PANEL: CPT

## 2021-06-13 PROCEDURE — 77010033678 HC OXYGEN DAILY

## 2021-06-13 PROCEDURE — 74011250636 HC RX REV CODE- 250/636: Performed by: NURSE PRACTITIONER

## 2021-06-13 PROCEDURE — 85027 COMPLETE CBC AUTOMATED: CPT

## 2021-06-13 PROCEDURE — 70551 MRI BRAIN STEM W/O DYE: CPT

## 2021-06-13 PROCEDURE — 77030040361 HC SLV COMPR DVT MDII -B

## 2021-06-13 PROCEDURE — 84100 ASSAY OF PHOSPHORUS: CPT

## 2021-06-13 PROCEDURE — 97112 NEUROMUSCULAR REEDUCATION: CPT

## 2021-06-13 PROCEDURE — 36415 COLL VENOUS BLD VENIPUNCTURE: CPT

## 2021-06-13 PROCEDURE — APPSS45 APP SPLIT SHARED TIME 31-45 MINUTES: Performed by: NURSE PRACTITIONER

## 2021-06-13 PROCEDURE — 83735 ASSAY OF MAGNESIUM: CPT

## 2021-06-13 PROCEDURE — 74011000250 HC RX REV CODE- 250: Performed by: NURSE PRACTITIONER

## 2021-06-13 PROCEDURE — 97535 SELF CARE MNGMENT TRAINING: CPT

## 2021-06-13 PROCEDURE — 65610000006 HC RM INTENSIVE CARE

## 2021-06-13 PROCEDURE — 80048 BASIC METABOLIC PNL TOTAL CA: CPT

## 2021-06-13 PROCEDURE — APPNB15 APP NON BILLABLE TIME 0-15 MINS: Performed by: NURSE PRACTITIONER

## 2021-06-13 RX ORDER — ATORVASTATIN CALCIUM 40 MG/1
40 TABLET, FILM COATED ORAL
Status: DISCONTINUED | OUTPATIENT
Start: 2021-06-13 | End: 2021-06-15 | Stop reason: HOSPADM

## 2021-06-13 RX ORDER — ASPIRIN 325 MG
325 TABLET ORAL DAILY
Status: DISCONTINUED | OUTPATIENT
Start: 2021-06-13 | End: 2021-06-13

## 2021-06-13 RX ADMIN — SODIUM CHLORIDE 5 MG/HR: 9 INJECTION, SOLUTION INTRAVENOUS at 12:47

## 2021-06-13 RX ADMIN — ATORVASTATIN CALCIUM 40 MG: 40 TABLET, FILM COATED ORAL at 21:23

## 2021-06-13 RX ADMIN — Medication 10 ML: at 15:03

## 2021-06-13 RX ADMIN — Medication 10 ML: at 09:04

## 2021-06-13 RX ADMIN — ASPIRIN 325 MG ORAL TABLET 325 MG: 325 PILL ORAL at 12:46

## 2021-06-13 RX ADMIN — Medication 10 ML: at 21:34

## 2021-06-13 RX ADMIN — Medication 10 ML: at 21:33

## 2021-06-13 RX ADMIN — SODIUM CHLORIDE, POTASSIUM CHLORIDE, SODIUM LACTATE AND CALCIUM CHLORIDE 50 ML/HR: 600; 310; 30; 20 INJECTION, SOLUTION INTRAVENOUS at 00:30

## 2021-06-13 RX ADMIN — SERTRALINE 50 MG: 50 TABLET, FILM COATED ORAL at 09:04

## 2021-06-13 RX ADMIN — APIXABAN 5 MG: 5 TABLET, FILM COATED ORAL at 21:23

## 2021-06-13 RX ADMIN — HYDROXYCHLOROQUINE SULFATE 200 MG: 200 TABLET ORAL at 09:04

## 2021-06-13 RX ADMIN — METOPROLOL SUCCINATE 25 MG: 25 TABLET, EXTENDED RELEASE ORAL at 09:04

## 2021-06-13 NOTE — ED NOTES
Patient arrives via [facility] for a Code Stroke level 2. The patient began having symptoms around 330 and had an NIH of 2. Now the patient has an NIH of 0 and is being taken for a thrombectomy by Dr. Juan Miguel Foote.

## 2021-06-13 NOTE — CONSULTS
Neurology Consult      Patient: Lynn Lockwood MRN: [id number]  SSN: [id number]    YOB: 1940  Age: 68 y.o. Sex: female      Chief Complaint: Right-sided weakness    Subjective:      Lynn Lockwood is a 68 y.o. female with a history of rheumatoid arthritis, atrial fibrillation, ITP requiring splenectomy, and intermittent cigarette smoking. Patient presented to the [facility] ER via EMS due to sliding from her chair to the floor and not being able to get up due to right sided weakness. She stated that she fell from the chair at approximately 3:30 or 4:00 pm. She was in her usual state of health prior to this event. On arrival to the ER she was taken for [address] which showed no acute hemorrhage, mass or infarct. Teleneurology did not want to administer tPA as patient's NIHSS was only a 2. She was taken back for a CTA of the head and neck and a proximal M1 occlusion was noted. There was no definite large completed infarction noted. Dr. Chiquis Luis with Neurointerventional was consulted and patient was flown to [facility] for endovascular intervention. On arrival to [facility] ER the patient's symptoms had all but resolved but she was at risk for deterioration due to the left M1 occlusion. Dr. Chiquis Luis discussed the risks and benefits of the procedure with the patient and she stated that she wanted to have the thrombectomy. Consent was obtained. She was taken to angio where a 10 mm thromboembolus was successfully removed from the proximal left M1 segment. Patient was transferred to the ICU for close monitoring. Of note, patient states that she does not take any aspirin or anticoagulation for her atrial fibrillation but that her doctor gave her samples of Eliquis last week and she was afraid to start taking them after reading the potential side effects.      Past Medical History:   Diagnosis Date    Menopause     Osteopenia 11/6/2015    PAC (premature atrial contraction)     PAT [facility]oxysmal [facility] tachycardia) [facility]     Rheumatoid arthritis ([facility] [address]      Family History   Problem Relation Age of Onset    Breast Cancer Mother     Breast Cancer Sister     Diabetes Father     Heart Disease Father      Social History     Tobacco Use    Smoking status: Current Every Day Smoker     Packs/day: 1.00     Years: 30.00     Pack years: 30.00     Types: Cigarettes     Last attempt to quit: 2016     Years since quittin.1    Smokeless tobacco: Never Used   Substance Use Topics    Alcohol use: Yes     Alcohol/week: 7.0 standard drinks     Types: 7 Standard drinks or equivalent per week      Prior to Admission Medications   Prescriptions Last Dose Informant Patient Reported? Taking?   diclofenac (VOLTAREN) 1 % gel   Yes No   Sig: Apply  to affected area four (4) times daily. Patient not taking: Reported on 2021   diphenhydrAMINE (BENADRYL) 25 mg tablet   Yes No   Sig: Take 25 mg by mouth nightly. Patient not taking: Reported on 2021   ergocalciferol (VITAMIN D2) 50,000 unit capsule   Yes No   Sig: Take 50,000 Units by mouth every seven (7) days. hydroxychloroquine (PLAQUENIL) 200 mg tablet   No No   Sig: Take 1 Tab by mouth daily. ibuprofen 200 mg cap   Yes No   Sig: Take  by mouth as needed. metoprolol succinate (TOPROL-XL) 25 mg XL tablet   No No   Sig: Take 1 tablet by mouth once daily   sertraline (ZOLOFT) 50 mg tablet   No No   Sig: Take 1 Tab by mouth daily. Facility-Administered Medications: None       Allergies   Allergen Reactions    Levaquin [Levofloxacin] Nausea Only       Review of Systems:  Reviewed and as stated in HPI, otherwise negative. Objective:     Vitals:    21 1238 21 1300 21 1400 21 1500   BP: (!) 167/63 121/66 (!) 110/49 (!) 100/50   Pulse: (!) 53 61 (!) 54 (!) 59   Resp: 15 18 16 17   Temp: 98.7 °F (37.1 °C)      SpO2: 97% 97% 94% 90%   Weight:          General: Well developed, well nourished.  Patient in no apparent distress   Head: Normocephalic, atraumatic, anicteric sclera   Lungs:  Clear to auscultation bilaterally, No wheezes or rubs   Cardiac: Irregular rate and rhythm with no murmurs. Abd: Non distended   Ext: No pedal edema   Skin: No overt signs of rash     Neurological Exam:  Mental Status: Alert and oriented to person place not date   Speech: Fluent no aphasia or dysarthria. Cranial Nerves:   Intact visual fields. Facial sensation is normal. Facial movement is symmetric. Palate is midline. Normal sternocleidomastoid strength. Tongue is midline. Hearing is intact bilaterally. Eyes: PERRL, EOM's full, no nystagmus, no ptosis. Motor:  Full and symmetric strength of upper and lower proximal and distal muscles. Normal bulk and tone. Reflexes:   Deep tendon reflexes 1+/4 and symmetrical.  Plantar response is downgoing b/l. Sensory:   Symmetrically intact  with no perceived deficits modalities involving small or large fibers. Gait:  Gait is deferred   Tremor:   No tremor noted. Cerebellar:  Finger to nose and heel over shin to knee was demonstrated competently. Labs:  Lab Results   Component Value Date/Time    WBC 13.8 (H) 06/13/2021 05:07 AM    HGB 13.4 06/13/2021 05:07 AM    HCT 41.7 06/13/2021 05:07 AM    PLATELET 024 26/10/7669 05:07 AM    MCV 97.7 06/13/2021 05:07 AM      Lab Results   Component Value Date/Time    Sodium 140 06/13/2021 05:07 AM    Potassium 3.9 06/13/2021 05:07 AM    Chloride 109 (H) 06/13/2021 05:07 AM    CO2 26 06/13/2021 05:07 AM    Anion gap 5 06/13/2021 05:07 AM    Glucose 108 (H) 06/13/2021 05:07 AM    BUN 11 06/13/2021 05:07 AM    Creatinine 0.61 06/13/2021 05:07 AM    BUN/Creatinine ratio 18 06/13/2021 05:07 AM    GFR est AA >60 06/13/2021 05:07 AM    GFR est non-AA >60 06/13/2021 05:07 AM    Calcium 8.8 06/13/2021 05:07 AM     Lab Results   Component Value Date/Time    Troponin-I, Qt. <0.05 06/12/2021 06:33 PM       Imaging:  CT Results (maximum last 3):   Results from [place] encounter on 06/12/21    CTA CODE NEURO HEAD AND NECK W CONT    Narrative  *PRELIMINARY REPORT*    Proximal M1 occlusion. No definite large completed infarction left MCA territory. Preliminary report was provided by Dr. Lyudmila Sainz, the on-call radiologist, at  7:41 PM    Final report to follow. *END PRELIMINARY REPORT*      CT CODE NEURO HEAD WO CONTRAST    Narrative  EXAM: Brain CT without contrast.    INDICATION: Code Stroke    TECHNIQUE: Noncontrast CT of the brain is performed with 5 mm collimation. Bone  algorithm axial and sagittal and coronal reconstructions performed and  evaluated. CT dose reduction was achieved through use of a standardized  protocol tailored for this examination and automatic exposure control for dose  modulation. COMPARISON: None    FINDINGS: There is no acute intracranial hemorrhage, mass, mass effect or  herniation. Ventricular system is normal. The gray-white matter differentiation  is well-preserved. The mastoid air cells are well pneumatized. The visualized  paranasal sinuses are normal.    Impression  No acute intracranial hemorrhage, mass or infarct. Assessment/Plan:     Hospital Problems  Date Reviewed: 5/25/2021        Codes Class Noted POA    Ischemic stroke [facility]) ICD-10-CM: I63.9  ICD-9-CM: 434.91  6/12/2021 Unknown            68year old female with a h/o Afib, RA, ITP, tobacco use presenting with right hemiparesis with associated proximal L M1 occlusion on CTA s/p endovascular intervention. Examination is presently non-focal apart from some mild disorientation to date, recall deficits (?baseline MCI). MRI Brain independently reviewed with small scattered acute infarcts involving the anterior and posterior circulation c/w embolic mechanism likely associated with her atrial fibrillation. Unfortunately she has been non-compliant with NOAC. Will start Eliquis today for stroke prevention along with statin therapy. Goal SBP<140, LDL<70. Stroke education.  No anticipated skilled needs.      Carlos Roberts,   06/13/21

## 2021-06-13 NOTE — PROGRESS NOTES
Pt arrives via stretcher to angio department accompanied by EMS for cerebral angiogram thrombectomy procedure. All assessments completed and consent was reviewed. Education given was regarding procedure, MAC sedation, post-procedure care and  management/follow-up. Opportunity for questions was provided and all questions and concerns were addressed. Date/Time Last Known Well Time:6- 1700    Date/Time Discovery of Stroke Symptoms:6- 1700    NIHSS upon arrival:0    Time to IR Suite:2120    Time of Arterial Puncture:2135      1st pass of recanalization device in   Target vessel:2149    Time of Revascularization:2154    Pretreatment TICI:0    Post treatment TICI:3    Procedure Stop Time(When sterile drape is off): 1865    Time of first set of VS, neuro cks, groin, and pulse checks:2210    Time transfer to ICU:2215    Time of last VS, neuro, groin, and pulse checks documentation before ICU caring for pt:2215        TRANSFER - OUT REPORT:    Verbal report given to Bacilio Mckeon RN(name) on  being transferred to ICU 14(unit) for routine progression of care       Report consisted of patients Situation, Background, Assessment and   Recommendations(SBAR). Information from the following report(s) SBAR, Procedure Summary, MAR and Cardiac Rhythm sinus bradycardia was reviewed with the receiving nurse. Lines:       Opportunity for questions and clarification was provided. Patient transported with:   Monitor  O2 @ 2 liters  Registered Nurse EUGENE Randolph/LINDA Phan              Pt arrives via stretcher to angio department accompanied by EMS for cerebral angiogram thrombectomy procedure. All assessments completed and consent was reviewed. Education given was regarding procedure, MAC sedation, post-procedure care and  management/follow-up. Opportunity for questions was provided and all questions and concerns were addressed.

## 2021-06-13 NOTE — CONSULTS
EVA Soler    Patient: Kecia Nolen MRN: [id number]  SSN: [id number]    YOB: 1940  Age: 68 y.o. Sex: female      Chief Complaint: Right-sided weakness    Subjective:      Kecia Nolen is a 68 y.o. female with a history of rheumatoid arthritis, atrial fibrillation, ITP requiring splenectomy, and intermittent cigarette smoking. Patient presented to the [facility] ER via EMS due to sliding from her chair to the floor and not being able to get up due to right sided weakness. She stated that she fell from the chair at approximately 3:30 or 4:00 pm. She was in her usual state of health prior to this event. On arrival to the ER she was taken for [facility] which showed no acute hemorrhage, mass or infarct. Teleneurology did not want to administer tPA as patient's NIHSS was only a 2. She was taken back for a CTA of the head and neck and a proximal M1 occlusion was noted. There was no definite large completed infarction noted. Dr. Angela Johnson with Neurointerventional was consulted and patient was flown to [facility] for endovascular intervention. On arrival to [facility] ER the patient's symptoms had all but resolved but she was at risk for deterioration due to the left M1 occlusion. Dr. Angela Johnson discussed the risks and benefits of the procedure with the patient and she stated that she wanted to have the thrombectomy. Consent was obtained. She was taken to angio where a 10 mm thromboembolus was successfully removed from the proximal left M1 segment. Patient was transferred to the ICU for close monitoring. Of note, patient states that she does not take any aspirin or anticoagulation for her atrial fibrillation but that her doctor gave her samples of Eliquis last week and she was afraid to start taking them after reading the potential side effects.      Past Medical History:   Diagnosis Date    Menopause     Osteopenia 11/6/2015    PAC (premature atrial contraction)     PAT (paroxysmal atrial tachycardia) (HCC)     Rheumatoid arthritis ([facility] [address]      Family History   Problem Relation Age of Onset    Breast Cancer Mother     Breast Cancer Sister     Diabetes Father     Heart Disease Father      Social History     Tobacco Use    Smoking status: Current Every Day Smoker     Packs/day: 1.00     Years: 30.00     Pack years: 30.00     Types: Cigarettes     Last attempt to quit: 2016     Years since quittin.1    Smokeless tobacco: Never Used   Substance Use Topics    Alcohol use: Yes     Alcohol/week: 7.0 standard drinks     Types: 7 Standard drinks or equivalent per week      Prior to Admission Medications   Prescriptions Last Dose Informant Patient Reported? Taking?   diclofenac (VOLTAREN) 1 % gel   Yes No   Sig: Apply  to affected area four (4) times daily. Patient not taking: Reported on 2021   diphenhydrAMINE (BENADRYL) 25 mg tablet   Yes No   Sig: Take 25 mg by mouth nightly. Patient not taking: Reported on 2021   ergocalciferol (VITAMIN D2) 50,000 unit capsule   Yes No   Sig: Take 50,000 Units by mouth every seven (7) days. hydroxychloroquine (PLAQUENIL) 200 mg tablet   No No   Sig: Take 1 Tab by mouth daily. ibuprofen 200 mg cap   Yes No   Sig: Take  by mouth as needed. metoprolol succinate (TOPROL-XL) 25 mg XL tablet   No No   Sig: Take 1 tablet by mouth once daily   sertraline (ZOLOFT) 50 mg tablet   No No   Sig: Take 1 Tab by mouth daily. Facility-Administered Medications: None       Allergies   Allergen Reactions    Levaquin [Levofloxacin] Nausea Only       Review of Systems:      Denies numbness, tingling, chest pain, leg pain, nausea, vomiting, difficulty swallowing, visual disturbance, headache, and dyspnea.      Objective:     Vitals:    21 2245 21 2300 21 2315 21 2330   BP: (!) 108/52 (!) 115/51 132/63    Pulse: 64 (!) 52 (!) 52 (!) 50   Resp: 26 14 16 16   Temp:       SpO2: 99% 100% 98% 99%      Physical Exam:  GENERAL: Calm, cooperative, NAD  SKIN: Warm, dry, color appropriate for ethnicity. Right groin site soft, with no odor, bleeding, bruising or drainage noted. Positive pedal pulses palpated bilaterally. Neurologic Exam:  Mental Status:  Alert and oriented x 4. Appropriate affect, mood and behavior. Language:    Normal fluency, repetition, comprehension and naming. Cranial Nerves:   Pupils 3 mm, equal, round and reactive to light. Visual fields full to confrontation. Extraocular movements intact. Facial sensation intact. Full facial strength, no asymmetry. Hearing grossly intact bilaterally. No dysarthria. Tongue protrudes to midline, palate elevates symmetrically. Shoulder shrug 5/5 bilaterally. Motor:    No pronator drift. Bulk and tone normal.      5/5 power in all extremities proximally and distally. No involuntary movements. Sensation:    Sensation intact throughout to light touch. Reflexes:    Negative Babinskis    Coordination & Gait: Gait not assessed. FTN and HTS intact with no ataxia present. Labs:  Lab Results   Component Value Date/Time    WBC 13.6 (H) 06/12/2021 06:33 PM    HGB 13.8 06/12/2021 06:33 PM    HCT 42.1 06/12/2021 06:33 PM    PLATELET 473 69/34/8235 06:33 PM    MCV 98.1 06/12/2021 06:33 PM      Lab Results   Component Value Date/Time    Sodium 139 06/12/2021 06:33 PM    Potassium 4.0 06/12/2021 06:33 PM    Chloride 103 06/12/2021 06:33 PM    CO2 26 06/12/2021 06:33 PM    Anion gap 10 06/12/2021 06:33 PM    Glucose 131 (H) 06/12/2021 06:33 PM    BUN 15 06/12/2021 06:33 PM    Creatinine 0.87 06/12/2021 06:33 PM    BUN/Creatinine ratio 17 06/12/2021 06:33 PM    GFR est AA >60 06/12/2021 06:33 PM    GFR est non-AA >60 06/12/2021 06:33 PM    Calcium 9.0 06/12/2021 06:33 PM     Lab Results   Component Value Date/Time    Troponin-I, Qt. <0.05 06/12/2021 06:33 PM       Imaging:  CT Results (maximum last 3):   Results from [facility] encounter on 06/12/21    CTA CODE NEURO HEAD AND NECK W CONT    Narrative  *PRELIMINARY REPORT*    Proximal M1 occlusion. No definite large completed infarction left MCA territory. Preliminary report was provided by Dr. Brigid Parra, the on-call radiologist, at  7:41 PM    Final report to follow. *END PRELIMINARY REPORT*      CT CODE NEURO HEAD WO CONTRAST    Narrative  EXAM: Brain CT without contrast.    INDICATION: Code Stroke    TECHNIQUE: Noncontrast CT of the brain is performed with 5 mm collimation. Bone  algorithm axial and sagittal and coronal reconstructions performed and  evaluated. CT dose reduction was achieved through use of a standardized  protocol tailored for this examination and automatic exposure control for dose  modulation. COMPARISON: None    FINDINGS: There is no acute intracranial hemorrhage, mass, mass effect or  herniation. Ventricular system is normal. The gray-white matter differentiation  is well-preserved. The mastoid air cells are well pneumatized. The visualized  paranasal sinuses are normal.    Impression  No acute intracranial hemorrhage, mass or infarct. Assessment:     Hospital Problems  Date Reviewed: 5/25/2021        Codes Class Noted POA    Ischemic stroke [facility]) ICD-10-CM: I63.9  ICD-9-CM: 434.91  6/12/2021 Unknown            Plan:   1.) CVA, left MCA. S/P mechanical thrombectomy by Dr. Savage Marquez             - 324 mg of aspirin given in the ER.               - NIHSS of 0 on exam (2 on arrival to [facility] ER)              - Post procedure neuro/vascular checks then q 1 hour   -LR 50 ml/h overnight              - A1C and lipid panel pending, LDL goal <70              - MRI brain ordered              - ECHO ordered              - PT/OT/SLP evals              - Stroke education             - SBP goal 110- 140 s/p thrombectomy              - Labetalol/Cardene PRN              - Home meds per hospitalist   -Smoking cessation   - CTH in am s/p thrombectomy.          I have discussed the diagnosis and the intended plan as seen in the above orders with Dejuan Joel NP, the patient and the primary RN. Patient's son Judge Olivares was called and updated on current plan of care and is in agreement. All questions were answered. He states he will drive to [place] tomorrow to see his mother. Thank you for this consult and participating in the care of this patient.   Signed By: Mine Agarwal NP     June 12, 2021

## 2021-06-13 NOTE — PROGRESS NOTES
Problem: Self Care Deficits Care Plan (Adult)  Goal: *Acute Goals and Plan of Care (Insert Text)  Description: FUNCTIONAL STATUS PRIOR TO ADMISSION: Patient was independent and active without use of DME.     HOME SUPPORT: The patient lived alone with boyfriend in area to provide assistance. Occupational Therapy Goals  Initiated 6/13/2021  1. Patient will perform ADLs standing 5 mins without fatigue or LOB with supervision/set-up within 7 day(s). 2.  Patient will perform lower body ADLs with supervision/set-up within 7 day(s). 3.  Patient will perform gathering ADL items high and low 2/2 with supervision/set-up within 7 day(s). 4.  Patient will perform toilet transfers with supervision/set-up within 7 day(s). 5.  Patient will perform all aspects of toileting with supervision/set-up within 7 day(s). 6.  Patient will participate in upper extremity therapeutic exercise/activities to increase independence with ADLs with supervision/set-up for 5 minutes within 7 day(s). Outcome: Not Met    OCCUPATIONAL THERAPY EVALUATION  Patient: Brock Vincent (46 y.o. female)  Date: 6/13/2021  Primary Diagnosis: Ischemic stroke [facility]) [I63.9]        Precautions: falls       ASSESSMENT  Based on the objective data described below, the patient presents with limited ADL performance s/p admission for ischemic stroke. Noted imaging positive for L MCA infarct with resultant need for emergent mechanical thrombectomy, now POD #1. Patient ADLs limited by impaired balance, generalized weakness, decreased functional activity tolerance and mildly impaired cognition (mild confusion, attention to task, safety). At baseline, patient lives alone and is IND with ADLs - endorses her boyfriend can assist PRN when she goes home. Today, patient completed bed mobility with supervision and functional mobility with up to CGA. Patient required min A to straighten her socks 2* impaired reach to feet.  Patient stood at sink for ~5 minutes with SBA to complete oral care. At end of session, patient left in chair with call bell in reach, RN aware, all needs met. Will continue to follow, recommend HHOT with increased assist from family/friends. Current Level of Function Impacting Discharge (ADLs/self-care): up to min A for ADLs    Functional Outcome Measure: The patient scored Total A-D  Total A-D (Motor Function): 66/66 on the Fugl-Pineda Assessment which is indicative of no impairment in upper extremity functional status. Other factors to consider for discharge: was living alone     Patient will benefit from skilled therapy intervention to address the above noted impairments. PLAN :  Recommendations and Planned Interventions: self care training, functional mobility training, therapeutic exercise, balance training, therapeutic activities, endurance activities, patient education, home safety training, and family training/education    Frequency/Duration: Patient will be followed by occupational therapy 5 times a week to address goals. Recommendation for discharge: (in order for the patient to meet his/her long term goals)  Occupational therapy at least 2 days/week in the home AND ensure assist and/or supervision for safety with IADLs    This discharge recommendation:  Has not yet been discussed the attending provider and/or case management    IF patient discharges home will need the following DME: patient owns DME required for discharge       SUBJECTIVE:   Patient stated I am exhausted.     OBJECTIVE DATA SUMMARY:   HISTORY:   Past Medical History:   Diagnosis Date    Menopause     Osteopenia 11/6/2015    PAC (premature atrial contraction)     PAT (paroxysmal atrial tachycardia) (HCC)     Rheumatoid arthritis (HCC)      Past Surgical History:   Procedure Laterality Date    HX APPENDECTOMY      spleen    HX HYSTERECTOMY      HX OOPHORECTOMY      HX SHOULDER ARTHROSCOPY      HX SPLENECTOMY       Expanded or extensive additional review of patient history:     Home Situation  Home Environment: Private residence  # Steps to Enter: 4  Rails to Enter: Yes  Hand Rails : Bilateral  Living Alone: Yes  Support Systems: Spouse/Significant Other/Partner  Current DME Used/Available at Home: Grab bars  Tub or Shower Type: Shower    Hand dominance: Right    EXAMINATION OF PERFORMANCE DEFICITS:  Cognitive/Behavioral Status:  Neurologic State: Alert  Orientation Level: Disoriented to time;Oriented to place;Oriented to person;Oriented to situation  Cognition: Appropriate for age attention/concentration; Follows commands  Perception: Appears intact  Perseveration: No perseveration noted  Safety/Judgement: Awareness of environment; Fall prevention    Skin: appears intact    Edema: none noted in BUEs    Hearing:       Vision/Perceptual:    Tracking: Able to track stimulus in all quadrants w/o difficulty                 Diplopia: No    Acuity: Within Defined Limits;Able to read clock/calendar on wall without difficulty    Corrective Lenses: Reading glasses    Range of Motion:  In BUEs  AROM: Within functional limits  PROM: Within functional limits    Strength: In BUEs  Strength: Generally decreased, functional    Coordination:  Coordination: Within functional limits  Fine Motor Skills-Upper: Left Intact; Right Intact    Gross Motor Skills-Upper: Left Intact; Right Intact    Tone & Sensation:  In BUEs  Tone: Normal  Sensation: Intact    Balance:  Sitting: Intact  Standing: Impaired; With support  Standing - Static: Good  Standing - Dynamic : Fair    Functional Mobility and Transfers for ADLs:  Bed Mobility:  Supine to Sit: Independent  Sit to Supine: Independent    Transfers:  Sit to Stand: Stand-by assistance  Stand to Sit: Stand-by assistance  Bed to Chair: Contact guard assistance    ADL Assessment:  Feeding: Setup    Oral Facial Hygiene/Grooming: Stand-by assistance (standing at sink )    Bathing: Stand-by assistance (Infer)    Upper Body Dressing: Stand-by assistance    Lower Body Dressing: Minimum assistance (2I limited reach to feet 2/2 stiffness-hx of RA)    Toileting: Stand by assistance    ADL Intervention and task modifications:     Grooming  Position Performed: Standing  Brushing Teeth: Stand-by assistance    Lower Body Dressing Assistance  Underpants: Stand-by assistance  Socks: Minimum assistance  Leg Crossed Method Used: Yes  Position Performed: Seated edge of bed  Cues: Don    Cognitive Retraining  Safety/Judgement: Awareness of environment; Fall prevention    Functional Measure:  Fugl-Pineda Assessment of Motor Recovery after Stroke:   Reflex Activity  Flexors/Biceps/Fingers: Can be elicited  Extensors/Triceps: Can be elicited  Reflex Subtotal: 4    Volitional Movement Within Synergies  Shoulder Retraction: Full  Shoulder Elevation: Full  Shoulder Abduction (90 degrees): Full  Shoulder External Rotation: Full  Elbow Flexion: Full  Forearm Supination: Full  Shoulder Adduction/Internal Rotation: Full  Elbow Extension: Full  Forearm Pronation: Full  Subtotal: 18    Volitional Movement Mixing Synergies  Hand to Lumbar Spine: Full  Shoulder Flexion (0-90 degrees): Full  Pronation-Supination: Full  Subtotal: 6    Volitional Movement With Little or No Synergy  Shoulder Abduction (0-90 degrees): Full  Shoulder Flexion ( degrees): Full  Pronation/Supination: Full  Subtotal : 6    Normal Reflex Activity  Biceps, Triceps, Finger Flexors:  Full  Subtotal : 2    Upper Extremity Total   Upper Extremity Total: 36    Wrist  Stability at 15 Degree Dorsiflexion: Full  Repeated Dorsiflexion/ Volar Flexion: Full  Stability at 15 Degree Dorsiflexion: Full  Repeated Dorsiflexion/ Volar Flexion: Full  Circumduction: Full  Wrist Total: 10    Hand  Mass Flexion: Full  Mass Extension: Full  Grasp A: Full  Grasp B: Full  Grasp C: Full  Grasp D: Full  Grasp E: Full  Hand Total: 14    Coordination/Speed  Tremor: None  Dysmetria: None  Time: <1s  Coordination/Speed Total : 6    Total A-D  Total A-D (Motor Function): 66/66     This is a reliable/valid measure of arm function after a neurological event. It has established value to characterize functional status and for measuring spontaneous and therapy-induced recovery; tests proximal and distal motor functions. Fugl-Pineda Assessment - UE scores recorded between five and 30 days post neurologic event can be used to predict UE recovery at six months post neurologic event. Severe = 0-21 points   Moderately Severe = 22-33 points   Moderate = 34-47 points   Mild = 48-66 points  BUD Prieto, WILFREDO Waddell, & MARYELLEN Zarate (1992). Measurement of motor recovery after stroke: Outcome assessment and sample size requirements.  Stroke, 23, pp. 0227-2277.   ------------------------------------------------------------------------------------------------------------------------------------------------------------------  MCID:  Stroke:   Andrae Joshua et al, 2001; n = 171; mean age 79 (6) years; assessed within 16 (12) days of stroke, Acute Stroke)  FMA Motor Scores from Admission to Discharge   10 point increase in FMA Upper Extremity = 1.5 change in discharge FIM   10 point increase in FMA Lower Extremity = 1.9 change in discharge FIM  MDC:   Stroke:   Carlito Powell et al, 2008, n = 14, mean age = 59.9 (14.6) years, assessed on average 14 (6.5) months post stroke, Chronic Stroke)   FMA = 5.2 points for the Upper Extremity portion of the assessment     Occupational Therapy Evaluation Charge Determination   History Examination Decision-Making   LOW Complexity : Brief history review  LOW Complexity : 1-3 performance deficits relating to physical, cognitive , or psychosocial skils that result in activity limitations and / or participation restrictions  LOW Complexity : No comorbidities that affect functional and no verbal or physical assistance needed to complete eval tasks       Based on the above components, the patient evaluation is determined to be of the following complexity level: LOW   Pain Rating:  None reported    Activity Tolerance:   Good    After treatment patient left in no apparent distress:    Sitting in chair, Call bell within reach, and RN aware    COMMUNICATION/EDUCATION:   The patients plan of care was discussed with: Physical therapist, Registered nurse, and Physician. Patient was educated regarding her deficit(s) of impaired balance as this relates to her diagnosis of L MCA infarct. She demonstrated Good understanding as evidenced by verbal responses. Home safety education was provided and the patient/caregiver indicated understanding. and Patient/family have participated as able in goal setting and plan of care. This patients plan of care is appropriate for delegation to [facility].     Thank you for this referral.  Nathalia Mohamud OT  Time Calculation: 48 mins

## 2021-06-13 NOTE — PROGRESS NOTES
Updated patient on MRI results. There is foci of acute or subacute ischemia in the left posterior periventricular deep white matter, left basal ganglia region and left posterior temporal subcortical white matter. Suspected focus of acute or subacute ischemia in the right proximal occipital cortex and additional in the right occipital subcortical white matter. Possible similar focus in the left peripheral cerebellum. Scattered foci of T2 and FLAIR hyperintensity in these and additional areas consistent with microvascular ischemic change. No acute hemorrhage obvious per preliminary radiology report. MRI of brain consistent with embolic mechanism likely associated with patient's history of atrial fibrillation. Patient started on Eliquis 5 mg twice daily   Lipid panel shows LDL 87.8, 40 mg of Lipitor ordered nightly    Discussed with Intensivist, Dr. Terrell Yoder, RN and patient.     Daja Gaona, [facility]  Neurocritical care NP

## 2021-06-13 NOTE — PROGRESS NOTES
[facility]    ICU TEAM Progress Note    Name: Marlee Ochoa   : 1940   MRN: [id number]   Date: 2021           ICU Assessment     1. Left CVA  2. S/p Left MCA M1 thrombectomy               ICU Comprehensive Plan of Care:     1. Neurological System  Neuro intact this AM  Spontaneous Awakening Trial: N/A  Sedation: N/A  Analgesia: None    2. Cardiovascular System  Keep -180  SBP Goal of: < 180 mmHg  MAP Goal of: > 65 mmHg  None - For above SBP/MAP goals  Transfusion Trigger (Hgb): <7 g/dL  Keep K > 4; Mg > 2     3. Respiratory System  N/A  Spontaneous Breathing Trial: N/A  Pulmonary toilet: Incentive Spirometry   SpO2 Goal: > 92%   DVT Prophylaxis: SCD's or Sequential Compression Device     4. Renal/GI/Endocrine System  IVFs: TKO  Ulcer Prophylaxis: Not at this time   Bowel Regimen: None needed at this time  Feeding:  No    Blood Sugar Goal 120-180 - Glycemic Control: Insulin    5. Infectious Disease  No issues  Indwelling Catheter:  Tubes: None  Lines: Peripheral IV  Drains: None  D - De-escalation of Antibiotics:   none  COVID Treatment:  N/A     6. PT/OT: PT consulted and on board and OT consulted and on board     7. Goals of Care Discussion with family Yes     8. Plan of Care/Code Status: Full Code    9. Discussed Care Plan with Bedside RN    10. Documentation of Current Medications    Subjective:   Progress Note: 2021      Reason for ICU Admission:  Left CVA     HPI: Please refer to H&P     Overnight Events:   2021  Left thrombectomy last evening    POD:  1 Left MCA M1 thrombectomy    S/P:   Doing well. Stable to transfer from an ICU stand point. Awaiting neurology/Neuro IR recs.     Active Problem List:     Problem List  Date Reviewed: 2021        Codes Class    Right sided weakness ICD-10-CM: R53.1  ICD-9-CM: 728.87         Ischemic stroke ([facility] [address] ICD-10-CM: I63.9  ICD-9-CM: 434.91         Stroke (cerebrum) ([facility] [address] ICD-10-CM: I63.9  ICD-9-CM: 434.91 Palpitations ICD-10-CM: R00.2  ICD-9-CM: 785.1         PAT (paroxysmal atrial tachycardia) ([facility]) ICD-10-CM: I47.1  ICD-9-CM: 427.0         Rheumatoid arthritis with positive rheumatoid factor ([facility]) ICD-10-CM: M05.9  ICD-9-CM: 714.0         Osteopenia ICD-10-CM: M85.80  ICD-9-CM: 733.90         MARLY (generalized anxiety disorder) ICD-10-CM: F41.1  ICD-9-CM: 300.02         Smoker ICD-10-CM: F17.200  ICD-9-CM: 305.1               Past Medical History:      has a past medical history of Menopause, Osteopenia (11/6/2015), PAC (premature atrial contraction), PAT (paroxysmal atrial tachycardia) ([facility] [address] and Rheumatoid arthritis ([facility] [address] Past Surgical History:      has a past surgical history that includes hx appendectomy; hx hysterectomy; hx splenectomy; hx shoulder arthroscopy; and hx oophorectomy. Home Medications:     Prior to Admission medications    Medication Sig Start Date End Date Taking? Authorizing Provider   metoprolol succinate (TOPROL-XL) 25 mg XL tablet Take 1 tablet by mouth once daily 12/15/20   Dorcas Lai MD   diclofenac (VOLTAREN) 1 % gel Apply  to affected area four (4) times daily. Patient not taking: Reported on 6/12/2021    Provider, Historical   ibuprofen 200 mg cap Take  by mouth as needed. Provider, Historical   ergocalciferol (VITAMIN D2) 50,000 unit capsule Take 50,000 Units by mouth every seven (7) days. Provider, Historical   sertraline (ZOLOFT) 50 mg tablet Take 1 Tab by mouth daily. 8/30/16   Mariel Escalante MD   hydroxychloroquine (PLAQUENIL) 200 mg tablet Take 1 Tab by mouth daily. 6/22/16   Mariel Escalante MD   diphenhydrAMINE (BENADRYL) 25 mg tablet Take 25 mg by mouth nightly. Patient not taking: Reported on 6/12/2021    Provider, Historical       Allergies/Social/Family History:      Allergies   Allergen Reactions    Levaquin [Levofloxacin] Nausea Only      Social History     Tobacco Use    Smoking status: Current Every Day Smoker     Packs/day: 1.00 Years: 30.00     Pack years: 30.00     Types: Cigarettes     Last attempt to quit: 2016     Years since quittin.1    Smokeless tobacco: Never Used   Substance Use Topics    Alcohol use: Yes     Alcohol/week: 7.0 standard drinks     Types: 7 Standard drinks or equivalent per week      Family History   Problem Relation Age of Onset    Breast Cancer Mother     Breast Cancer Sister     Diabetes Father     Heart Disease Father        Review of Systems:     A comprehensive review of systems was negative. Objective:   Vital Signs:  Visit Vitals  BP (!) 142/72 Comment: metoprolol given    Pulse (!) 49   Temp 98.1 °F (36.7 °C)   Resp 14   Wt 84.5 kg (186 lb 4.6 oz)   SpO2 96%   BMI 28.75 kg/m²    O2 Flow Rate (L/min): 2 l/min O2 Device: None (Room air) Temp (24hrs), Av °F (36.7 °C), Min:97.7 °F (36.5 °C), Max:98.1 °F (36.7 °C)           Intake/Output:     Intake/Output Summary (Last 24 hours) at 2021 0918  Last data filed at 2021 2206  Gross per 24 hour   Intake 500 ml   Output    Net 500 ml       Physical Exam:    Visit Vitals  BP (!) 130/94 (BP Patient Position: Sitting)   Pulse 68   Temp 98.1 °F (36.7 °C)   Resp 14   Wt 84.5 kg (186 lb 4.6 oz)   SpO2 96%   BMI 28.75 kg/m²     General:  Alert, cooperative, no distress, appears stated age. Head:  Normocephalic, without obvious abnormality, atraumatic. Eyes:  Conjunctivae/corneas clear. PERRL, EOMs intact. Fundi benign. Ears:  Normal TMs and external ear canals both ears. Nose: Nares normal. Septum midline. Mucosa normal. No drainage or sinus tenderness. Throat: Lips, mucosa, and tongue normal. Teeth and gums normal.   Neck: Supple, symmetrical, trachea midline, no adenopathy, thyroid: no enlargement/tenderness/nodules, no carotid bruit and no JVD. Back:   Symmetric, no curvature. ROM normal. No CVA tenderness. Lungs:   Clear to auscultation bilaterally. Chest wall:  No tenderness or deformity.    Heart:  Regular rate and rhythm, S1, S2 normal, no murmur, click, rub or gallop. Breast Exam:  No tenderness, masses, or nipple abnormality. Abdomen:   Soft, non-tender. Bowel sounds normal. No masses,  No organomegaly. Extremities: Extremities normal, atraumatic, no cyanosis or edema. Pulses: 2+ and symmetric all extremities. Skin: Skin color, texture, turgor normal. No rashes or lesions. Lymph nodes: Cervical, supraclavicular, and axillary nodes normal.   Neurologic: CNII-XII intact. Normal strength, sensation and reflexes throughout. LABS AND  DATA: Personally reviewed  Recent Labs     06/13/21 0507 06/12/21 1833   WBC 13.8* 13.6*   HGB 13.4 13.8   HCT 41.7 42.1    277     Recent Labs     06/13/21 0507 06/12/21 1833    139   K 3.9 4.0   * 103   CO2 26 26   BUN 11 15   CREA 0.61 0.87   * 131*   CA 8.8 9.0   MG 2.2  --    PHOS 3.3  --      Recent Labs     06/12/21 1833   AP 98   TP 6.7   ALB 3.3*   GLOB 3.4     Recent Labs     06/13/21 0507 06/12/21 1833   INR 1.0 1.1   PTP 10.7 10.6   APTT 21.7*  --       No results for input(s): PHI, PCO2I, PO2I, FIO2I in the last 72 hours. Recent Labs     06/12/21 1833   TROIQ <0.05       Hemodynamics:   PAP:   CO:     Wedge:   CI:     CVP:    SVR:       PVR:       Ventilator Settings:  Mode Rate Tidal Volume Pressure FiO2 PEEP                    Peak airway pressure:      Minute ventilation:          MEDS: Reviewed    Chest X-Ray:  CXR Results  (Last 48 hours)               06/12/21 1926  XR CHEST PORT Final result    Impression:  No acute process identified. Narrative:  Clinical history: CVA   INDICATION:   CVA   COMPARISON: None       FINDINGS:   AP portable upright view of the chest demonstrates a stable  cardiopericardial   silhouette. There is no pleural effusion. .There is no focal consolidation. .Left   shoulder arthroplasty. . Patient is on a cardiac monitor.                     ECHO:        Multidisciplinary Rounds Completed:  No    ABCDEF Bundle/Checklist Completed:  Yes    SPECIAL EQUIPMENT  None    DISPOSITION  Transfer to non-ICU bed    CRITICAL CARE CONSULTANT NOTE  I had a face to face encounter with the patient, reviewed and interpreted patient data including clinical events, labs, images, vital signs, I/O's, and examined patient. I have discussed the case and the plan and management of the patient's care with the consulting services, the bedside nurses and the respiratory therapist.      NOTE OF PERSONAL INVOLVEMENT IN CARE   This patient has a high probability of imminent, clinically significant deterioration, which requires the highest level of preparedness to intervene urgently. I participated in the decision-making and personally managed or directed the management of the following life and organ supporting interventions that required my frequent assessment to treat or prevent imminent deterioration. I personally spent 30 minutes of critical care time. This is time spent at this critically ill patient's bedside actively involved in patient care as well as the coordination of care. This does not include any procedural time which has been billed separately.     [id number] [facility]  6/13/2021

## 2021-06-13 NOTE — PROGRESS NOTES
Bedside shift change report given to Samanhta Bates RN  (oncoming nurse) by Dale Rodrigues RN  (offgoing nurse). Report included the following information SBAR, Intake/Output, MAR, Recent Results, Alarm Parameters  and Dual Neuro Assessment. Bedside shift change report given to ROMEO Madrigal  (oncoming nurse) by Samantha Bates RN  (offgoing nurse). Report included the following information SBAR, Intake/Output, MAR, Recent Results and Dual Neuro Assessment.

## 2021-06-13 NOTE — PROGRESS NOTES
Orders received, chart reviewed and patient evaluated by occupational therapy. Pending progression with skilled acute occupational therapy, recommend:  Occupational therapy at least 2 days/week in the home      Recommend with nursing ADLs with supervision/setup, OOB to chair 3x/day and toileting via functional mobility to and from bathroom with 1 assist. Thank you for completing as able in order to maintain patient strength, endurance and independence. Full evaluation to follow.

## 2021-06-13 NOTE — PROGRESS NOTES
TRANSITIONS OF CARE PLAN:   1. RUR: 11%  2. DESTINATION: TBD - potentially own home  3. TRANSPORT: TBD - potentially son  4. NEEDS FOR DISCHARGE:  5. ANTICIPATED FOLLOW UPS:  6. ONGOING INPATIENT NEEDS:     Anticipated Discharge is: Likely Greater than 48 Hours     Reason for Admission:  Ischemic Stroke                RUR Score:     11%                Plan for utilizing home health:          PCP: First and Last name:  Lavell Morris MD     Name of Practice: [address] [place] [address] [facility]   Are you a current patient: Yes/No: yes   Approximate date of last visit: 3 weeks    Can you participate in a virtual visit with your PCP: No                    Current Advanced Directive/Advance Care Plan: Full Code    Healthcare Decision Maker:   Click here to complete [address] including selection of the Healthcare Decision Maker Relationship (ie \"Primary\")                             Transition of Care Plan:  CM notes CM Consult for Assistance with Discharge Planning. CM met with patient, with patient alert and oriented x4. Patient lives alone in a single story home, 6 exterior steps. Patient has no home DME. Patient is independent in ADLs, to include driving. Patient has no hx of HH or Rehab. Pharmacy preference is [facility] in [place]. Patient does have an AMD at home. Patient identified that she has 2 sons. Patient's sons live in [place]. Patient has local friends that are supportive, including significant other Rochelle Rutledge). Potential disposition is for discharge to own home with transport TBD. Care Management Interventions  PCP Verified by CM: Yes (last seen by Dr. Mary Alice Jack a few weeks ago)  Mode of Transport at Discharge:  Other (see comment) (son)  Transition of Care Consult (CM Consult): Discharge Planning  MyChart Signup: No  Discharge Durable Medical Equipment: No (none)  Health Maintenance Reviewed: Yes (CM met with patient with patient alert and oriented x4)  Physical Therapy Consult: Yes  Occupational Therapy Consult: Yes  Speech Therapy Consult: Yes  Current Support Network: Own Home, Lives Alone  Confirm Follow Up Transport: Self (independent in adls, to include driving and living alone)  [facility]  Discharge Location  Discharge Placement: Unable to determine at this time (lives alone in a single story home, 6 exterior steps)                      CRM: Keila Tapia, MPH, [address] [place] [address]; Z: [phone]

## 2021-06-13 NOTE — PROGRESS NOTES
[facility] TRANSFER - OUT REPORT:    Verbal report given to Parvin Medina RN(name) on Estefanía Agrawal  being transferred to ICU 14(unit) for routine progression of care       Report consisted of patients Situation, Background, Assessment and   Recommendations(SBAR). Information from the following report(s) SBAR, Procedure Summary, MAR and Cardiac Rhythm sinus bradycardia was reviewed with the receiving nurse. Lines:   Peripheral IV 06/12/21 Left Antecubital (Active)   Site Assessment Clean, dry, & intact 06/12/21 1837   Phlebitis Assessment 0 06/12/21 1837   Infiltration Assessment 0 06/12/21 1837   Dressing Status Clean, dry, & intact 06/12/21 1837   Dressing Type Transparent 06/12/21 1837   Hub Color/Line Status Pink;Capped;Flushed 06/12/21 1837        Opportunity for questions and clarification was provided.       Patient transported with:   Monitor  O2 @ 2 liters  Registered Nurse/CRNA

## 2021-06-13 NOTE — ANESTHESIA PREPROCEDURE EVALUATION
Relevant Problems   No relevant active problems       Anesthetic History   No history of anesthetic complications            Review of Systems / Medical History  Patient summary reviewed, nursing notes reviewed and pertinent labs reviewed    Pulmonary  Within defined limits                 Neuro/Psych       CVA  TIA     Cardiovascular            Dysrhythmias            GI/Hepatic/Renal  Within defined limits              Endo/Other  Within defined limits           Other Findings   Comments: RA         Physical Exam    Airway  Mallampati: II  TM Distance: > 6 cm  Neck ROM: normal range of motion   Mouth opening: Normal     Cardiovascular  Regular rate and rhythm,  S1 and S2 normal,  no murmur, click, rub, or gallop             Dental  No notable dental hx       Pulmonary  Breath sounds clear to auscultation               Abdominal  GI exam deferred       Other Findings            Anesthetic Plan    ASA: 3, emergent  Anesthesia type: MAC and general - backup    Monitoring Plan: Arterial line      Induction: Intravenous  Anesthetic plan and risks discussed with: Patient

## 2021-06-13 NOTE — CONSULTS
[facility]    ICU TEAM Consult Note    Name: Cira Alva   : 1940   MRN: [id number]   Date: 2021      Assessment:     ICU Problems:    1. Acute Left MCA occlusion, M1 segment  2. S/P thrombectomy (TICI 3 post procedure)  3. Hx of paroxysmal atrial tachyardia      Imaging:    CT Results  (Last 48 hours)               21 1934  CTA CODE NEURO HEAD AND NECK W CONT Preliminary result    Narrative:  *PRELIMINARY REPORT*       Proximal M1 occlusion. No definite large completed infarction left MCA territory. Preliminary report was provided by Dr. Jack Mata, the on-call radiologist, at   7:41 PM       Final report to follow. *END PRELIMINARY REPORT*                               21 1827  CT CODE NEURO HEAD WO CONTRAST Final result    Impression:  No acute intracranial hemorrhage, mass or infarct. Narrative:  EXAM: Brain CT without contrast.       INDICATION: Code Stroke        TECHNIQUE: Noncontrast CT of the brain is performed with 5 mm collimation. Bone   algorithm axial and sagittal and coronal reconstructions performed and   evaluated. CT dose reduction was achieved through use of a standardized   protocol tailored for this examination and automatic exposure control for dose   modulation. COMPARISON: None       FINDINGS: There is no acute intracranial hemorrhage, mass, mass effect or   herniation. Ventricular system is normal. The gray-white matter differentiation   is well-preserved. The mastoid air cells are well pneumatized. The visualized   paranasal sinuses are normal.               ICU Comprehensive Plan of Care:     Plans for this Shift:     1. Admit to ICU  2. Hourly neurological checks  3. Fall precautions  4. PT/OT/SLP consult  5. Consult neurology  6. Consult NIS  7. Post thrombectomy protocol  8. Monitor distal pulses  9. Smoking cessation counseling  10. Watch for ETOH withdraw (7 drinks/week average)  11. SBP Goal of: 120-140  12.  MAP Goal of: 65-85 mmHg  13. Phenylephrine and Nicardipine (Cardene) - For above SBP/MAP goals  14. IVFs:   15. Transfusion Trigger (Hgb): <7 g/dL  16. Respiratory Goals:  a. N/A  17. Pulmonary toilet: Incentive Spirometry   18. SpO2 Goal: > 92%  19. Keep K>4; Mg>2   20. PT/OT: PT consulted and on board, OT consulted and on board and Speech therapy consulted and on board   21. Goals of Care Discussion with family Yes   25. Plan of Care/Code Status: Full Code  23. Appreciate Consultants Input   24. Discussed Care Plan with Bedside RN  25. Documentation of Current Medications  26. Rest of Plan Below:    F - Feeding:  Pending SLP  A - Analgesia: Fentanyl  S - Sedation: N/A  T - DVT Prophylaxis: SCD's or Sequential Compression Device   H - Head of Bed: > 30 Degrees  U - Ulcer Prophylaxis: Not at this time   G - Glycemic Control: Insulin  S - Spontaneous Breathing Trial: N/A  B - Bowel Regimen: MiraLax  I - Indwelling Catheter:   Tubes: None  Lines: Peripheral IV  Drains: None  D - De-escalation of Antibiotics: None    Subjective:   Progress Note: 6/12/2021      Reason for ICU Admission: Ischemic Stroke     HPI: Patient is a 68year old female with known past medical history of menopause, osteopenia, paroxysmal atrial tachycardia and rheumatoid arthritis who presented to ED at outside hospital with sudden onset right sided weakness and fall from a chair at home. She was able to call EMS who took her to the ED where her CTA revealed a right M1 occlusion. She was brought to [facility] via helicopter and went to angio with Dr. Barak Alaniz who was able to perform thrombectomy to the vessel. Patient is now in ICU for recovery and post thrombectomy care. She still has some diminished sensation to the right lower extremity and some right sided weakness 4/5. No facial droop appreciated. She states her symptoms are better now. Offers not other complaints.        POD:  * No surgery found *    S/P:       Active Problem List:     Problem List  Date Reviewed: 5/25/2021        Codes Class    Right sided weakness ICD-10-CM: R53.1  ICD-9-CM: 728.87         Ischemic stroke ([facility] ICD-10-CM: I63.9  ICD-9-CM: 434.91         Stroke (cerebrum) ([facility] ICD-10-CM: I63.9  ICD-9-CM: 434.91         Palpitations ICD-10-CM: R00.2  ICD-9-CM: 785.1         PAT (paroxysmal atrial tachycardia) ([facility]) ICD-10-CM: I47.1  ICD-9-CM: 427.0         Rheumatoid arthritis with positive rheumatoid factor (HCC) ICD-10-CM: M05.9  ICD-9-CM: 714.0         Osteopenia ICD-10-CM: M85.80  ICD-9-CM: 733.90         MARLY (generalized anxiety disorder) ICD-10-CM: F41.1  ICD-9-CM: 300.02         Smoker ICD-10-CM: F17.200  ICD-9-CM: 305.1               Past Medical History:      has a past medical history of Menopause, Osteopenia (11/6/2015), PAC (premature atrial contraction), PAT (paroxysmal atrial tachycardia) ([facility] and Rheumatoid arthritis ([facility] Past Surgical History:      has a past surgical history that includes hx appendectomy; hx hysterectomy; hx splenectomy; hx shoulder arthroscopy; and hx oophorectomy. Home Medications:     Prior to Admission medications    Medication Sig Start Date End Date Taking? Authorizing Provider   metoprolol succinate (TOPROL-XL) 25 mg XL tablet Take 1 tablet by mouth once daily 12/15/20   Dorcas Lai MD   diclofenac (VOLTAREN) 1 % gel Apply  to affected area four (4) times daily. Patient not taking: Reported on 6/12/2021    Provider, Historical   ibuprofen 200 mg cap Take  by mouth as needed. Provider, Historical   ergocalciferol (VITAMIN D2) 50,000 unit capsule Take 50,000 Units by mouth every seven (7) days. Provider, Historical   sertraline (ZOLOFT) 50 mg tablet Take 1 Tab by mouth daily. 8/30/16   Mariel Escalante MD   hydroxychloroquine (PLAQUENIL) 200 mg tablet Take 1 Tab by mouth daily. 6/22/16   Mariel Escalante., MD   diphenhydrAMINE (BENADRYL) 25 mg tablet Take 25 mg by mouth nightly.   Patient not taking: Reported on 6/12/2021 Provider, Historical       Allergies/Social/Family History: Allergies   Allergen Reactions    Levaquin [Levofloxacin] Nausea Only      Social History     Tobacco Use    Smoking status: Current Every Day Smoker     Packs/day: 1.00     Years: 30.00     Pack years: 30.00     Types: Cigarettes     Last attempt to quit: 2016     Years since quittin.1    Smokeless tobacco: Never Used   Substance Use Topics    Alcohol use: Yes     Alcohol/week: 7.0 standard drinks     Types: 7 Standard drinks or equivalent per week      Family History   Problem Relation Age of Onset    Breast Cancer Mother     Breast Cancer Sister     Diabetes Father     Heart Disease Father        Review of Systems:     Pertinent items are noted in HPI. Objective:   Vital Signs:  Visit Vitals  /86   Pulse (!) 52   SpO2 100%        Temp (24hrs), Av.9 °F (36.6 °C), Min:97.9 °F (36.6 °C), Max:97.9 °F (36.6 °C)           Intake/Output:     Intake/Output Summary (Last 24 hours) at 2021 2246  Last data filed at 2021 2206  Gross per 24 hour   Intake 500 ml   Output    Net 500 ml       Physical Exam:    General:  Alert, cooperative, well noursished, well developed, appears stated age   Eyes:  Sclera anicteric. Pupils equally round and reactive to light. Mouth/Throat: Mucous membranes normal, oral pharynx clear   Neck: Supple   Lungs:   Clear to auscultation bilaterally, good effort   CV:  Regular rate and rhythm,no murmur, click, rub or gallop   Abdomen:   Soft, non-tender. bowel sounds normal. non-distended   Extremities: No cyanosis or edema.  Right sided weaknes   Skin: Skin color, texture, turgor normal. no acute rash or lesions   Lymph nodes: Cervical and supraclavicular normal   Musculoskeletal: No swelling or deformity   Lines/Devices:  Intact, no erythema, drainage or tenderness   Psych: Alert and oriented, normal mood affect given the setting       LABS AND  DATA: Personally reviewed  Recent Labs 06/12/21 1833   WBC 13.6*   HGB 13.8   HCT 42.1        Recent Labs     06/12/21 1833      K 4.0      CO2 26   BUN 15   CREA 0.87   *   CA 9.0     Recent Labs     06/12/21 1833   AP 98   TP 6.7   ALB 3.3*   GLOB 3.4     Recent Labs     06/12/21 1833   INR 1.1   PTP 10.6      No results for input(s): PHI, PCO2I, PO2I, FIO2I in the last 72 hours. Recent Labs     06/12/21 1833   TROIQ <0.05       Hemodynamics:   PAP:   CO:     Wedge:   CI:     CVP:    SVR:       PVR:       Ventilator Settings:  Mode Rate Tidal Volume Pressure FiO2 PEEP                    Peak airway pressure:      Minute ventilation:          MEDS: Reviewed    Chest X-Ray:  CXR Results  (Last 48 hours)               06/12/21 1926  XR CHEST PORT Final result    Impression:  No acute process identified. Narrative:  Clinical history: CVA   INDICATION:   CVA   COMPARISON: None       FINDINGS:   AP portable upright view of the chest demonstrates a stable  cardiopericardial   silhouette. There is no pleural effusion. .There is no focal consolidation. .Left   shoulder arthroplasty. . Patient is on a cardiac monitor. ECHO:  Pending      Multidisciplinary Rounds Completed:  Pending    ABCDEF Bundle/Checklist Completed:  Yes    SPECIAL EQUIPMENT  None    DISPOSITION  Stay in ICU    CRITICAL CARE CONSULTANT NOTE  I had a face to face encounter with the patient, reviewed and interpreted patient data including clinical events, labs, images, vital signs, I/O's, and examined patient. I have discussed the case and the plan and management of the patient's care with the consulting services, the bedside nurses and the respiratory therapist.      NOTE OF PERSONAL INVOLVEMENT IN CARE   This patient has a high probability of imminent, clinically significant deterioration, which requires the highest level of preparedness to intervene urgently.  I participated in the decision-making and personally managed or directed the management of the following life and organ supporting interventions that required my frequent assessment to treat or prevent imminent deterioration. I personally spent 65 minutes of critical care time. This is time spent at this critically ill patient's bedside actively involved in patient care as well as the coordination of care and discussions with the patient's family. This does not include any procedural time which has been billed separately.       Keila Ventura [facility]     Critical Care Medicine  [facility]

## 2021-06-13 NOTE — PROGRESS NOTES
PT Note:    Orders received, chart reviewed and patient evaluated by physical therapy. Pending progression with skilled acute physical therapy, recommend: To be determined: HHPT with increased support from boyfriend vs OP NEURO PT    Recommend with nursing OOB to chair 3x/day and walking daily with CGA/min assist and HHA. Thank you for completing as able in order to maintain patient strength, endurance and independence. Full evaluation to follow.

## 2021-06-13 NOTE — PROGRESS NOTES
Left MCA M1 occlusion discussed with the patient. NIH 0 with SBP high 150's currently Potential risks of conservative management with possible neurological deterioration vs risks of thrombectomy were discussed with the patient who decided to proceed with thrombectomy to reduce the risk of deterioration and neurological disability. She understands that the procedure could worsen her condition. ICU observation with no intervention was offered and discussed but the patient expressed a strong desire to [facility] the clot removed\". No family available for discussion but the patient is competent to make decisions. Per patient report, her initial call to 911 this evening resulted from symptoms of complete inability to move her right side.

## 2021-06-13 NOTE — PROGRESS NOTES
Problem: Mobility Impaired (Adult and Pediatric)  Goal: *Acute Goals and Plan of Care (Insert Text)  Description:   FUNCTIONAL STATUS PRIOR TO ADMISSION: Patient was independent and active without use of DME.    HOME SUPPORT PRIOR TO ADMISSION: The patient lived alone with boyfriend to provide assistance. Physical Therapy Goals  Initiated 6/13/2021  2. Patient will transfer from bed to chair and chair to bed with independence using the least restrictive device within 7 day(s). 3.  Patient will perform sit to stand with independence within 7 day(s). 4.  Patient will ambulate with independence for 150 feet with the least restrictive device within 7 day(s). 5.  Patient will ascend/descend 4 stairs with 2 handrail(s) with modified independence within 7 day(s). 6.  Patient will improve Rolle Balance score by 7 points within 7 days. Outcome: Progressing Towards Goal     PHYSICAL THERAPY EVALUATION- NEURO POPULATION  Patient: Willy Tsai (47 y.o. female)  Date: 6/13/2021  Primary Diagnosis: Ischemic stroke [facility]) [I63.9]        Precautions:          ASSESSMENT  Based on the objective data described below, the patient presents with decreased balance, activity tolerance, and functional independence with mobility following admission for ischemic stroke. Patient s/p thrombectomy 6/12/2021. Patient overall CGA to SBA for transfers and gait. Patient required use of IV pole for support during ambulation due to decreased balance-path deviations noted although no overt LOB noted. Patient reporting balance does not feel at baseline. She is at moderate risk for falls based on ROLLE assessment. Recommend HHPT with increased family support vs OP PT for balance. Current Level of Function Impacting Discharge (mobility/balance): CGA    Functional Outcome Measure: The patient scored Total: 35/56 on the Rolle Balance Assessment which is indicative of moderate fall risk.     Other factors to consider for discharge: Patient will benefit from skilled therapy intervention to address the above noted impairments. PLAN :  Recommendations and Planned Interventions: transfer training, gait training, therapeutic exercises, neuromuscular re-education, patient and family training/education, and therapeutic activities      Frequency/Duration: Patient will be followed by physical therapy:  5 times a week to address goals.     Recommendation for discharge: (in order for the patient to meet his/her long term goals)  To be determined: HHPT vs OP for balance    This discharge recommendation:  Has not yet been discussed the attending provider and/or case management    IF patient discharges home will need the following DME: none         SUBJECTIVE:   Patient stated My balance isn't back to normal.    OBJECTIVE DATA SUMMARY:   HISTORY:    Past Medical History:   Diagnosis Date    Menopause     Osteopenia 11/6/2015    PAC (premature atrial contraction)     PAT (paroxysmal atrial tachycardia) (HCC)     Rheumatoid arthritis ([facility]      Past Surgical History:   Procedure Laterality Date    HX APPENDECTOMY      spleen    HX HYSTERECTOMY      HX OOPHORECTOMY      HX SHOULDER ARTHROSCOPY      HX SPLENECTOMY         Personal factors and/or comorbidities impacting plan of care:     Home Situation  Home Environment: Private residence  # Steps to Enter: 4  Rails to Enter: Yes  Hand Rails : Bilateral  Living Alone: Yes  Support Systems: Spouse/Significant Other/Partner  Current DME Used/Available at Home: Grab bars  Tub or Shower Type: Shower    EXAMINATION/PRESENTATION/DECISION MAKING:   Critical Behavior:  Neurologic State: Alert  Orientation Level: Disoriented to time, Oriented to place, Oriented to person, Oriented to situation  Cognition: Appropriate for age attention/concentration, Follows commands  Safety/Judgement: Awareness of environment, Fall prevention  Hearing:     Skin:    Edema:   Range Of Motion:  AROM: Within functional limits PROM: Within functional limits           Strength:    Strength: Generally decreased, functional                    Tone & Sensation:   Tone: Normal              Sensation: Intact               Coordination:  Coordination: Within functional limits  Vision:   Tracking: Able to track stimulus in all quadrants w/o difficulty  Diplopia: No  Acuity: Within Defined Limits;Able to read clock/calendar on wall without difficulty  Corrective Lenses: Reading glasses  Functional Mobility:  Bed Mobility:     Supine to Sit: Independent  Sit to Supine: Independent     Transfers:  Sit to Stand: Stand-by assistance  Stand to Sit: Stand-by assistance        Bed to Chair: Contact guard assistance              Balance:   Sitting: Intact  Standing: Impaired; With support  Standing - Static: Good  Standing - Dynamic : Fair  Ambulation/Gait Training:  Distance (ft): 100 Feet (ft)  Assistive Device:  (intermittent use of IV pole)  Ambulation - Level of Assistance: Contact guard assistance        Gait Abnormalities: Altered arm swing;Decreased step clearance; Path deviations        Base of Support: Narrowed     Speed/Archana: Pace decreased (<100 feet/min)  Step Length: Right shortened;Left shortened                     Stairs:               Therapeutic Exercises:       Functional Measure  [place] Balance Test:    Sitting to Standing: 3  Standing Unsupported: 3  Sitting with Back Unsupported: 4  Standing to Sittin  Transfers: 2  Standing Unsupported with Eyes Closed: 3  Standing Unsupported with Feet Together: 2  Reach Forward with Outstretched Arm: 3   Object: 3  Turn to Look Over Shoulders: 3  Turn 360 Degrees: 2  Alternate Foot on Step/Stool: 1  Standing Unsupported One Foot in Front: 1  Stand on One Le  Total: 35/56         56=Maximum possible score;   0-20=High fall risk  21-40=Moderate fall risk   41-56=Low fall risk       Activity Tolerance:   Fair      After treatment patient left in no apparent distress:   Sitting in chair and Call bell within reach    COMMUNICATION/EDUCATION:   The patients plan of care was discussed with: Occupational therapist and Registered nurse. Fall prevention education was provided and the patient/caregiver indicated understanding., Patient/family have participated as able in goal setting and plan of care. , and Patient/family agree to work toward stated goals and plan of care.     Thank you for this referral.  Mae Rich, PT   Time Calculation: 50 mins

## 2021-06-13 NOTE — ED NOTES
Patient arrived via air transport from outside hospital for evaluation by neuro interventional surgery, for MCA occlusion. Upon arrival patient awake and alert in no acute distress, evaluated by Dr. Marina Chris at bedside, and proceeded immediately to angiography.

## 2021-06-13 NOTE — ANESTHESIA POSTPROCEDURE EVALUATION
* No procedures listed *. MAC, general - backup    Anesthesia Post Evaluation        Patient participation: complete - patient participated  Level of consciousness: awake  Pain management: adequate  Airway patency: patent  Anesthetic complications: no  Cardiovascular status: hemodynamically stable  Respiratory status: acceptable  Hydration status: acceptable  Comments: The patient is ready for PACU discharge. Malgorzata Trinidad DO                   Post anesthesia nausea and vomiting:  controlled      INITIAL Post-op Vital signs: No vitals data found for the desired time range.

## 2021-06-13 NOTE — PROGRESS NOTES
Neurointerventional Surgery Progress Note  Vi Frank AGACNP-BC  Neurocritical Care NP      Admit Date: 6/12/2021   LOS: 1 day        Daily Progress Note: 6/13/2021      S/P: Postop day 1 cerebral angiogram for a left MCA M1 mechanical thrombectomy    HPI: Willy Tsai is a 68 y.o. female with PMH of rheumatoid arthritis, atrial fibrillation, ITP requiring splenectomy, and intermittent cigarette smoking. Patient presented to the [facility] ER on 6/12/2021 via EMS due to sliding from her chair to the floor and not being able to get up due to right sided weakness. She reportedly fell from the chair at approximately 3:30 or 4:00 pm. She was in her usual state of health prior to this event. On arrival to the ER, she was taken for a CT of the head which showed no acute hemorrhage, mass, or infarct. Teleneurology did not want to administer tPA as patient's NIHSS was only a 2. She was taken back for a CTA of the head and neck and a proximal M1 occlusion was noted. There was no definite large completed infarction noted. Dr. Kathia Guevara with Neurointerventional was consulted and patient was flown to [facility] for endovascular intervention. On arrival to [facility] ER, the patient's symptoms had resolved, but she was at risk for deterioration due to the left M1 occlusion. Dr. Kathia Guevara discussed the risks and benefits of the procedure with the patient and she stated that she wanted to have the thrombectomy. Consent was obtained. She was taken to angio where a 10 mm thromboembolus was successfully removed from the proximal left M1 segment. Patient was transferred to the ICU for close monitoring.     Subjective:   No acute events overnight. Patient is sitting up in bed about to work with PT/OT. She occasionally has some trouble thinking of the words she wants to say. She denies any headache, vision changes, dizziness, chest pain, shortness of breath, nausea, vomiting, numbness, tingling, or weakness.   She reports that she was given samples of Eliquis to take PTA for her atrial fibrillation, but she did not want to take the medication due to the potential side effects.            Current Facility-Administered Medications   Medication Dose Route Frequency Provider Last Rate Last Admin    sodium chloride (NS) flush 5-40 mL  5-40 mL IntraVENous Q8H Enrique Chavez NP   10 mL at 06/13/21 0904    sodium chloride (NS) flush 5-40 mL  5-40 mL IntraVENous PRN Enrique Chavez NP        acetaminophen (TYLENOL) tablet 650 mg  650 mg Oral Q6H PRN Enrique Chavez NP        Or   [facility] acetaminophen (TYLENOL) suppository 650 mg  650 mg Rectal Q6H PRN Enrique Chavez NP        polyethylene glycol (MIRALAX) packet 17 g  17 g Oral DAILY PRN Enrique Chavez NP        ondansetron (ZOFRAN ODT) tablet 4 mg  4 mg Oral Q8H PRN Enrique Chavez NP        Or    [facility] injection 4 mg  4 mg IntraVENous Q6H PRN Enrique Chavez NP        sodium chloride (NS) flush 5-40 mL  5-40 mL IntraVENous Q8H Stan Dubose NP   10 mL at 06/13/21 7358    sodium chloride (NS) flush 5-40 mL  5-40 mL IntraVENous PRN Stan Dubose NP        ELECTROLYTE REPLACEMENT NOTE: Nurse to review Serum Potassium and Magnesuim levels and Initiate Electrolyte Replacement Protocol as needed  1 Each Other PRN Stan Dubose NP        ELECTROLYTE REPLACEMENT NOTE: Nurse to review Serum Phosphate levels and Initiate Electrolyte Replacement Protocol as needed  1 Each Other PRN Stan Dubose NP        hydrOXYchloroQUINE (PLAQUENIL) tablet 200 mg  200 mg Oral DAILY Enrique Chavez NP   200 mg at 06/13/21 9340    metoprolol succinate (TOPROL-XL) XL tablet 25 mg  25 mg Oral DAILY Enrique Chavez NP   25 mg at 06/13/21 5863    sertraline (ZOLOFT) tablet 50 mg  50 mg Oral DAILY Enrique Chavez, NP   50 mg at 06/13/21 0904    niCARdipine (CARDENE) 25 mg in 0.9% sodium chloride 250 mL infusion  0-15 mg/hr IntraVENous TITRATE Enrique Chavez NP   Held at 06/13/21 0000    lactated Ringers infusion  50 mL/hr IntraVENous CONTINUOUS Yaritza Semen, NP 50 mL/hr at 06/13/21 0030 50 mL/hr at 06/13/21 0030     Facility-Administered Medications Ordered in Other Encounters   Medication Dose Route Frequency Provider Last Rate Last Admin    lactated Ringers infusion  125 mL/hr IntraVENous CONTINUOUS Stokes, Savannah I, DO        0.9% sodium chloride infusion  25 mL/hr IntraVENous CONTINUOUS Stokes, Savannah I, DO        sodium chloride (NS) flush 5-40 mL  5-40 mL IntraVENous Q8H Stokes, Savannah I, DO        sodium chloride (NS) flush 5-40 mL  5-40 mL IntraVENous PRN Stokes, Savannah I, DO        lidocaine (PF) (XYLOCAINE) 10 mg/mL (1 %) injection 0.1 mL  0.1 mL SubCUTAneous PRN Stokes, Savannah I, DO        fentaNYL citrate (PF) injection 50 mcg  50 mcg IntraVENous PRN Stokes, Savannah I, DO        midazolam (VERSED) injection 1 mg  1 mg IntraVENous PRN Stokes, Savannah I, DO        acetaminophen (TYLENOL) tablet 650 mg  650 mg Oral ONCE Stokes, Savannah I, DO        lactated Ringers infusion  125 mL/hr IntraVENous CONTINUOUS Stokes, Savannah I, DO        sodium chloride (NS) flush 5-40 mL  5-40 mL IntraVENous Q8H Stokes, Savannah I, DO        sodium chloride (NS) flush 5-40 mL  5-40 mL IntraVENous PRN Stokes, Savannah I, DO        oxyCODONE-acetaminophen (PERCOCET) 5-325 mg per tablet 1 Tablet  1 Tablet Oral PRN Stokes, Savannah I, DO        fentaNYL citrate (PF) injection 25 mcg  25 mcg IntraVENous Multiple Stokes, Savannah I, DO        morphine injection 2 mg  2 mg IntraVENous Multiple Stokes, Savannah I, DO        HYDROmorphone (PF) (DILAUDID) injection 0.2 mg  0.2 mg IntraVENous Multiple Stokes, Savannah I, DO        ondansetron (ZOFRAN) injection 4 mg  4 mg IntraVENous PRN Stokes, Savannah I, DO        midazolam (VERSED) injection 0.5 mg  0.5 mg IntraVENous Q5MIN PRN Trinidad, Malgorzata I, DO            Allergies   Allergen Reactions    Levaquin [Levofloxacin] Nausea Only       Review of Systems:  Pertinent items are noted in the History of Present Illness. Objective:     Vital signs  Temp (24hrs), Av °F (36.7 °C), Min:97.7 °F (36.5 °C), Max:98.1 °F (36.7 °C)   No intake/output data recorded.  1901 -  0700  In: 500 [I.V.:500]  Out: -     Visit Vitals  BP (!) 142/72   Pulse (!) 49   Temp 98.1 °F (36.7 °C)   Resp 14   Wt 186 lb 4.6 oz (84.5 kg)   SpO2 96%   BMI 28.75 kg/m²    O2 Flow Rate (L/min): 2 l/min O2 Device: None (Room air)     Pain control  Pain Assessment  Pain Scale 1: Numeric (0 - 10)  Pain Intensity 1: 0          Vitals:    21 0630 21 0700 21 0800 21 0900   BP: (!) 129/105 (!) 141/64 100/81 (!) 142/72   Pulse: (!) 54 (!) 48 (!) 53 (!) 49   Resp: 13 17 15 14   Temp:   98.1 °F (36.7 °C)    SpO2: 98% 95% 96% 96%   Weight:            Physical Exam:  GENERAL: alert, cooperative, no distress, appears stated age  EYE: conjunctivae/corneas clear. PERRL, EOM's intact. LUNG: clear to auscultation bilaterally  HEART: regular rate and rhythm, S1, S2 normal, no murmur, click, rub or gallop  EXTREMITIES:  extremities normal, atraumatic, no cyanosis or edema, left pedal pulse +2, right pedal pulse +1, Posterior tibial pulses +1 bilaterally. SKIN: Appropriate for ethnicity. Skin warm to touch. Right groin site clean, dry, and intact. No hematoma, bleeding, or bruising noted. Neurologic Exam:  Mental Status:  Alert and oriented x 4. Some delayed responses when answering orientation questions. Appropriate mood and affect. Language:    Normal fluency, repetition, comprehension and naming. Cranial Nerves:        Pupils equal, round and reactive to light. Visual fields full to confrontation. Extraocular movements intact. Facial sensation intact. Full facial strength, no asymmetry. Hearing grossly intact bilaterally. No dysarthria. Tongue protrudes to midline, palate elevates symmetrically.       Shoulder shrug 5/5 bilaterally. Motor:    No pronator drift. Bulk and tone normal.      5/5 power in all extremities proximally and distally. No involuntary movements. Sensation:    Sensation intact throughout to light touch and pinprick. No neglect noted. Coordination & Gait: No ataxia with finger-to-nose and heel-to-shin bilaterally. Gait deferred. NIHSS:      1a-LOC:0    1b-Month/Age:0    1c-Open/Close Hand:0    2-Best Gaze:0    3-Visual Fields:0    4-Facial Palsy:0    5a-Left Arm:0    5b-Right Arm:0    6a-Left Le    6b-Right Le    7-Limb Ataxia:0    8-Sensory:0    9-Best Language:0    10-Dysarthria:0    11-Extinction/Inattention:0  TOTAL SCORE:0    24 hour results:    Recent Results (from the past 24 hour(s))   GLUCOSE, POC    Collection Time: 21  6:19 PM   Result Value Ref Range    Glucose (POC) 129 (H) 65 - 117 mg/dL    Performed by Nicolette Hobson    CBC WITH AUTOMATED DIFF    Collection Time: 21  6:33 PM   Result Value Ref Range    WBC 13.6 (H) 3.6 - 11.0 K/uL    RBC 4.29 3.80 - 5.20 M/uL    HGB 13.8 11.5 - 16.0 g/dL    HCT 42.1 35.0 - 47.0 %    MCV 98.1 80.0 - 99.0 FL    MCH 32.2 26.0 - 34.0 PG    MCHC 32.8 30.0 - 36.5 g/dL    RDW 13.2 11.5 - 14.5 %    PLATELET 378 556 - 668 K/uL    MPV 9.5 8.9 - 12.9 FL    NRBC 0.0 0  WBC    ABSOLUTE NRBC 0.00 0.00 - 0.01 K/uL    NEUTROPHILS 72 32 - 75 %    LYMPHOCYTES 20 12 - 49 %    MONOCYTES 7 5 - 13 %    EOSINOPHILS 1 0 - 7 %    BASOPHILS 0 0 - 1 %    IMMATURE GRANULOCYTES 0 0.0 - 0.5 %    ABS. NEUTROPHILS 9.7 (H) 1.8 - 8.0 K/UL    ABS. LYMPHOCYTES 2.6 0.8 - 3.5 K/UL    ABS. MONOCYTES 1.0 0.0 - 1.0 K/UL    ABS. EOSINOPHILS 0.1 0.0 - 0.4 K/UL    ABS. BASOPHILS 0.1 0.0 - 0.1 K/UL    ABS. IMM.  GRANS. 0.1 (H) 0.00 - 0.04 K/UL    DF AUTOMATED     METABOLIC PANEL, COMPREHENSIVE    Collection Time: 21  6:33 PM   Result Value Ref Range    Sodium 139 136 - 145 mmol/L    Potassium 4.0 3.5 - 5.1 mmol/L    Chloride 103 97 - 108 mmol/L    CO2 26 21 - 32 mmol/L    Anion gap 10 5 - 15 mmol/L    Glucose 131 (H) 65 - 100 mg/dL    BUN 15 6 - 20 MG/DL    Creatinine 0.87 0.55 - 1.02 MG/DL    BUN/Creatinine ratio 17 12 - 20      GFR est AA >60 >60 ml/min/1.73m2    GFR est non-AA >60 >60 ml/min/1.73m2    Calcium 9.0 8.5 - 10.1 MG/DL    Bilirubin, total 0.3 0.2 - 1.0 MG/DL    ALT (SGPT) 32 12 - 78 U/L    AST (SGOT) 23 15 - 37 U/L    Alk.  phosphatase 98 45 - 117 U/L    Protein, total 6.7 6.4 - 8.2 g/dL    Albumin 3.3 (L) 3.5 - 5.0 g/dL    Globulin 3.4 2.0 - 4.0 g/dL    A-G Ratio 1.0 (L) 1.1 - 2.2     PROTHROMBIN TIME + INR    Collection Time: 06/12/21  6:33 PM   Result Value Ref Range    INR 1.1 0.9 - 1.1      Prothrombin time 10.6 9.0 - 11.1 sec   TROPONIN I    Collection Time: 06/12/21  6:33 PM   Result Value Ref Range    Troponin-I, Qt. <0.05 <0.05 ng/mL   EKG, 12 LEAD, INITIAL    Collection Time: 06/12/21  6:54 PM   Result Value Ref Range    Ventricular Rate 53 BPM    Atrial Rate 53 BPM    P-R Interval 142 ms    QRS Duration 94 ms    Q-T Interval 460 ms    QTC Calculation (Bezet) 431 ms    Calculated P Axis -11 degrees    Calculated R Axis 57 degrees    Calculated T Axis 41 degrees    Diagnosis       Sinus bradycardia  Otherwise normal ECG  No previous ECGs available     COVID-19 WITH INFLUENZA A/B    Collection Time: 06/12/21  7:40 PM   Result Value Ref Range    SARS-CoV-2 Not detected NOTD      Influenza A by PCR Not detected NOTD      Influenza B by PCR Not detected NOTD     HEMOGLOBIN A1C WITH EAG    Collection Time: 06/13/21  5:07 AM   Result Value Ref Range    Hemoglobin A1c 5.9 (H) 4.0 - 5.6 %    Est. average glucose 412 mg/dL   METABOLIC PANEL, BASIC    Collection Time: 06/13/21  5:07 AM   Result Value Ref Range    Sodium 140 136 - 145 mmol/L    Potassium 3.9 3.5 - 5.1 mmol/L    Chloride 109 (H) 97 - 108 mmol/L    CO2 26 21 - 32 mmol/L    Anion gap 5 5 - 15 mmol/L    Glucose 108 (H) 65 - 100 mg/dL    BUN 11 6 - 20 MG/DL    Creatinine 0.61 0.55 - 1.02 MG/DL BUN/Creatinine ratio 18 12 - 20      GFR est AA >60 >60 ml/min/1.73m2    GFR est non-AA >60 >60 ml/min/1.73m2    Calcium 8.8 8.5 - 10.1 MG/DL   MAGNESIUM    Collection Time: 06/13/21  5:07 AM   Result Value Ref Range    Magnesium 2.2 1.6 - 2.4 mg/dL   PHOSPHORUS    Collection Time: 06/13/21  5:07 AM   Result Value Ref Range    Phosphorus 3.3 2.6 - 4.7 MG/DL   CBC W/O DIFF    Collection Time: 06/13/21  5:07 AM   Result Value Ref Range    WBC 13.8 (H) 3.6 - 11.0 K/uL    RBC 4.27 3.80 - 5.20 M/uL    HGB 13.4 11.5 - 16.0 g/dL    HCT 41.7 35.0 - 47.0 %    MCV 97.7 80.0 - 99.0 FL    MCH 31.4 26.0 - 34.0 PG    MCHC 32.1 30.0 - 36.5 g/dL    RDW 13.3 11.5 - 14.5 %    PLATELET 312 004 - 915 K/uL    MPV 9.8 8.9 - 12.9 FL    NRBC 0.0 0  WBC    ABSOLUTE NRBC 0.00 0.00 - 0.01 K/uL   PROTHROMBIN TIME + INR    Collection Time: 06/13/21  5:07 AM   Result Value Ref Range    INR 1.0 0.9 - 1.1      Prothrombin time 10.7 9.0 - 11.1 sec   PTT    Collection Time: 06/13/21  5:07 AM   Result Value Ref Range    aPTT 21.7 (L) 22.1 - 31.0 sec    aPTT, therapeutic range     58.0 - 77.0 SECS          Imaging:  CT Results  (Last 48 hours)               06/13/21 0315  CT HEAD WO CONT Final result    Impression:  No acute intracranial process. No evidence of intracranial hemorrhage status post thrombectomy. Imaging findings consistent with mild chronic microvascular ischemic change. There is a mild to moderate degree of cerebral atrophy. Narrative:  CLINICAL HISTORY: s/p cva with thrombectomy   INDICATION: s/p cva with thrombectomy   COMPARISON: 6/12/2021. CT dose reduction was achieved through use of a standardized protocol tailored   for this examination and automatic exposure control for dose modulation. TECHNIQUE: Serial axial images with a collimation of 5 mm were obtained from the   skull base through the vertex     FINDINGS:    There is sulcal and ventricular prominence.  Confluent periventricular and scattered foci of hypodensity in the cerebral white matter. There is no evidence   of an acute infarction, hemorrhage, or mass-effect. There is no evidence of   midline shift or hydrocephalus. Posterior fossa structures are unremarkable. No   extra-axial collections are seen. Mastoid air cells are well pneumatized and clear. There is no evidence of depressed skull fractures of soft tissue swelling.           06/12/21 1934  CTA CODE NEURO HEAD AND NECK W CONT Preliminary result    Narrative:  PRELIMINARY REPORT       Proximal M1 occlusion. No definite large completed infarction left MCA territory. Preliminary report was provided by Dr. Tauna Babinski, the on-call radiologist, at   7:41 PM       Final report to follow. END PRELIMINARY REPORT                               06/12/21 1827  CT CODE NEURO HEAD WO CONTRAST Final result    Impression:  No acute intracranial hemorrhage, mass or infarct. Narrative:  EXAM: Brain CT without contrast.       INDICATION: Code Stroke        TECHNIQUE: Noncontrast CT of the brain is performed with 5 mm collimation. Bone   algorithm axial and sagittal and coronal reconstructions performed and   evaluated. CT dose reduction was achieved through use of a standardized   protocol tailored for this examination and automatic exposure control for dose   modulation. COMPARISON: None       FINDINGS: There is no acute intracranial hemorrhage, mass, mass effect or   herniation. Ventricular system is normal. The gray-white matter differentiation   is well-preserved. The mastoid air cells are well pneumatized. The visualized   paranasal sinuses are normal.                 Assessment:     Active Problems:    Ischemic stroke ([address] (6/12/2021)        Plan:    Acute Left MCA Ischemic CVA due to left MCA occlusion  - Status post left MCA mechanical thrombectomy by Dr. Margaux Chaparro on 6/12/2021  - Repeat head CT this AM shows no acute process.   No evidence of intracranial hemorrhage status post thrombectomy. Imaging findings consistent with mild chronic microvascular ischemic change. There is a mild to moderate degree of cerebral atrophy.  - Start 325 mg of aspirin daily for stroke prevention  - NIHSS 0 on exam today  - Continue neurochecks every hour post thrombectomy   - MRI of brain pending to assess for acute ischemia  - ECHO pending  - Hemoglobin A1c ok at 5.9  - SBP goal 110-140 status post thrombectomy, Cardene PRN, on scheduled home dose of metoprolol 25 mg daily  - Lipid panel pending, LDL goal less than 70  - PT/OT/SLP evals  - Stroke education  - Counseled on smoking cessation  - Neurology consult    Activity: Up with assistance  DVT ppx: SCDs  Dispo: TBD    Plan d/w Dr. Alexa Watts, Dr. Radha Presley, RN, and patient.        Noam Ball NP

## 2021-06-14 ENCOUNTER — APPOINTMENT (OUTPATIENT)
Dept: NON INVASIVE DIAGNOSTICS | Age: 81
DRG: 024 | End: 2021-06-14
Attending: NURSE PRACTITIONER
Payer: MEDICARE

## 2021-06-14 ENCOUNTER — APPOINTMENT (OUTPATIENT)
Dept: VASCULAR SURGERY | Age: 81
DRG: 024 | End: 2021-06-14
Attending: NURSE PRACTITIONER
Payer: MEDICARE

## 2021-06-14 LAB
ANION GAP SERPL CALC-SCNC: 6 MMOL/L (ref 5–15)
BUN SERPL-MCNC: 12 MG/DL (ref 6–20)
BUN/CREAT SERPL: 17 (ref 12–20)
CALCIUM SERPL-MCNC: 8.6 MG/DL (ref 8.5–10.1)
CHLORIDE SERPL-SCNC: 110 MMOL/L (ref 97–108)
CO2 SERPL-SCNC: 26 MMOL/L (ref 21–32)
CREAT SERPL-MCNC: 0.69 MG/DL (ref 0.55–1.02)
ECHO AO ROOT DIAM: 2.66 CM
ECHO AV AREA PEAK VELOCITY: 2.32 CM2
ECHO AV AREA/BSA PEAK VELOCITY: 1.2 CM2/M2
ECHO AV PEAK GRADIENT: 5.48 MMHG
ECHO AV PEAK VELOCITY: 117.08 CM/S
ECHO LA MAJOR AXIS: 3.83 CM
ECHO LA MINOR AXIS: 1.95 CM
ECHO LV INTERNAL DIMENSION DIASTOLIC: 4.73 CM (ref 3.9–5.3)
ECHO LV INTERNAL DIMENSION SYSTOLIC: 3.1 CM
ECHO LV IVSD: 0.85 CM (ref 0.6–0.9)
ECHO LV MASS 2D: 131.7 G (ref 67–162)
ECHO LV MASS INDEX 2D: 67.2 G/M2 (ref 43–95)
ECHO LV POSTERIOR WALL DIASTOLIC: 0.83 CM (ref 0.6–0.9)
ECHO LVOT DIAM: 1.76 CM
ECHO LVOT PEAK GRADIENT: 5.03 MMHG
ECHO LVOT PEAK VELOCITY: 112.1 CM/S
ECHO MV A VELOCITY: 79.45 CM/S
ECHO MV AREA PHT: 4.04 CM2
ECHO MV E DECELERATION TIME (DT): 187.56 MS
ECHO MV E VELOCITY: 99.09 CM/S
ECHO MV E/A RATIO: 1.25
ECHO MV PRESSURE HALF TIME (PHT): 54.39 MS
ECHO PV PEAK INSTANTANEOUS GRADIENT SYSTOLIC: 1.94 MMHG
ECHO TV REGURGITANT MAX VELOCITY: 305.22 CM/S
ECHO TV REGURGITANT PEAK GRADIENT: 37.26 MMHG
ERYTHROCYTE [DISTWIDTH] IN BLOOD BY AUTOMATED COUNT: 13.3 % (ref 11.5–14.5)
GLUCOSE SERPL-MCNC: 98 MG/DL (ref 65–100)
HCT VFR BLD AUTO: 40.3 % (ref 35–47)
HGB BLD-MCNC: 12.9 G/DL (ref 11.5–16)
MAGNESIUM SERPL-MCNC: 2.2 MG/DL (ref 1.6–2.4)
MCH RBC QN AUTO: 31.7 PG (ref 26–34)
MCHC RBC AUTO-ENTMCNC: 32 G/DL (ref 30–36.5)
MCV RBC AUTO: 99 FL (ref 80–99)
NRBC # BLD: 0 K/UL (ref 0–0.01)
NRBC BLD-RTO: 0 PER 100 WBC
PHOSPHATE SERPL-MCNC: 2.6 MG/DL (ref 2.6–4.7)
PLATELET # BLD AUTO: 268 K/UL (ref 150–400)
PMV BLD AUTO: 9.9 FL (ref 8.9–12.9)
POTASSIUM SERPL-SCNC: 4.1 MMOL/L (ref 3.5–5.1)
RBC # BLD AUTO: 4.07 M/UL (ref 3.8–5.2)
SODIUM SERPL-SCNC: 142 MMOL/L (ref 136–145)
WBC # BLD AUTO: 11.1 K/UL (ref 3.6–11)

## 2021-06-14 PROCEDURE — 92610 EVALUATE SWALLOWING FUNCTION: CPT

## 2021-06-14 PROCEDURE — 65660000000 HC RM CCU STEPDOWN

## 2021-06-14 PROCEDURE — 97530 THERAPEUTIC ACTIVITIES: CPT

## 2021-06-14 PROCEDURE — 74011250637 HC RX REV CODE- 250/637: Performed by: NURSE PRACTITIONER

## 2021-06-14 PROCEDURE — 93306 TTE W/DOPPLER COMPLETE: CPT | Performed by: SPECIALIST

## 2021-06-14 PROCEDURE — C8929 TTE W OR WO FOL WCON,DOPPLER: HCPCS

## 2021-06-14 PROCEDURE — 84100 ASSAY OF PHOSPHORUS: CPT

## 2021-06-14 PROCEDURE — 80048 BASIC METABOLIC PNL TOTAL CA: CPT

## 2021-06-14 PROCEDURE — 74011250637 HC RX REV CODE- 250/637: Performed by: PSYCHIATRY & NEUROLOGY

## 2021-06-14 PROCEDURE — 97116 GAIT TRAINING THERAPY: CPT

## 2021-06-14 PROCEDURE — 36415 COLL VENOUS BLD VENIPUNCTURE: CPT

## 2021-06-14 PROCEDURE — 74011250636 HC RX REV CODE- 250/636: Performed by: HEALTH CARE PROVIDER

## 2021-06-14 PROCEDURE — 99232 SBSQ HOSP IP/OBS MODERATE 35: CPT | Performed by: PSYCHIATRY & NEUROLOGY

## 2021-06-14 PROCEDURE — 74011000250 HC RX REV CODE- 250: Performed by: HEALTH CARE PROVIDER

## 2021-06-14 PROCEDURE — 83735 ASSAY OF MAGNESIUM: CPT

## 2021-06-14 PROCEDURE — 85027 COMPLETE CBC AUTOMATED: CPT

## 2021-06-14 PROCEDURE — 93922 UPR/L XTREMITY ART 2 LEVELS: CPT

## 2021-06-14 RX ORDER — DIPHENHYDRAMINE HCL 25 MG
25 CAPSULE ORAL
Status: DISCONTINUED | OUTPATIENT
Start: 2021-06-14 | End: 2021-06-15 | Stop reason: HOSPADM

## 2021-06-14 RX ADMIN — APIXABAN 5 MG: 5 TABLET, FILM COATED ORAL at 21:01

## 2021-06-14 RX ADMIN — METOPROLOL SUCCINATE 25 MG: 25 TABLET, EXTENDED RELEASE ORAL at 08:10

## 2021-06-14 RX ADMIN — PERFLUTREN 1.5 ML: 6.52 INJECTION, SUSPENSION INTRAVENOUS at 09:28

## 2021-06-14 RX ADMIN — APIXABAN 5 MG: 5 TABLET, FILM COATED ORAL at 08:10

## 2021-06-14 RX ADMIN — Medication 10 ML: at 06:00

## 2021-06-14 RX ADMIN — Medication 10 ML: at 22:27

## 2021-06-14 RX ADMIN — HYDROXYCHLOROQUINE SULFATE 200 MG: 200 TABLET ORAL at 08:10

## 2021-06-14 RX ADMIN — SERTRALINE 50 MG: 50 TABLET, FILM COATED ORAL at 08:10

## 2021-06-14 RX ADMIN — ATORVASTATIN CALCIUM 40 MG: 40 TABLET, FILM COATED ORAL at 21:01

## 2021-06-14 NOTE — PROGRESS NOTES
Problem: Mobility Impaired (Adult and Pediatric)  Goal: *Acute Goals and Plan of Care (Insert Text)  Description:   FUNCTIONAL STATUS PRIOR TO ADMISSION: Patient was independent and active without use of DME.    HOME SUPPORT PRIOR TO ADMISSION: The patient lived alone with boyfriend to provide assistance. Physical Therapy Goals  Initiated 6/13/2021  2. Patient will transfer from bed to chair and chair to bed with independence using the least restrictive device within 7 day(s). 3.  Patient will perform sit to stand with independence within 7 day(s). 4.  Patient will ambulate with independence for 150 feet with the least restrictive device within 7 day(s). 5.  Patient will ascend/descend 4 stairs with 2 handrail(s) with modified independence within 7 day(s). 6.  Patient will improve De La Torre Balance score by 7 points within 7 days. Outcome: Progressing Towards Goal     PHYSICAL THERAPY TREATMENT  Patient: Kandi Garcia (13 y.o. female)  Date: 6/14/2021  Diagnosis: [facility]schemic stroke ([facility] [I63.9] <principal problem not specified>       Precautions:    Chart, physical therapy assessment, plan of care and goals were reviewed. ASSESSMENT  Patient continues with skilled PT services and is progressing towards goals - remains most limited by impaired cognition (memory, complex problem solving, insight to safety, etc) and mild gait/balance impairments leading to increased falls risk from baseline level. Received pt supine in bed, cleared for mobility. Participated in progressive gait training with emphasis on integrating head movements, directional changes, and obstacle avoidance. Demos mild instabilities but able to correct from all - no overt LOB. Completed 10x sit<>stands with large target reaching on initial stance for improved functional strength and dynamic balance. Remained up in chair at end of session, NAD.     At this time, anticipate that she will be appropriate for discharge home with family assist and OP PT vs HHPT however concerned from a cognitive standpoint. Pt may benefit from formal cognitive assessment especially for med management  . Fozia Stoner Will follow. Current Level of Function Impacting Discharge (mobility/balance): CGA for mobility without AD     Other factors to consider for discharge: lives alone and will have extra family support for a few weeks; impaired cognition          PLAN :  Patient continues to benefit from skilled intervention to address the above impairments. Continue treatment per established plan of care. to address goals. Recommendation for discharge: (in order for the patient to meet his/her long term goals)  To be determined: OP vs HHPT    This discharge recommendation:  A follow-up discussion with the attending provider and/or case management is planned    IF patient discharges home will need the following DME: none       SUBJECTIVE:   Patient stated I keep forgetting, I can' keep it straight.     OBJECTIVE DATA SUMMARY:   Critical Behavior:  Neurologic State: Alert  Orientation Level: Oriented X4  Cognition: Appropriate decision making, Follows commands  Safety/Judgement: Awareness of environment  Functional Mobility Training:  Bed Mobility:     Supine to Sit: Independent     Transfers:  Sit to Stand: Stand-by assistance  Stand to Sit: Stand-by assistance     Balance:  Sitting: Intact  Standing: Impaired  Standing - Static: Good  Standing - Dynamic : Good;Fair  Ambulation/Gait Training:  Distance (ft): 300 Feet (ft)  Assistive Device: Gait belt  Ambulation - Level of Assistance: Contact guard assistance  Base of Support: Narrowed  Speed/Archana: Slow;Shuffled      Activity Tolerance:   Good    After treatment patient left in no apparent distress:   Sitting in chair and Call bell within reach    COMMUNICATION/COLLABORATION:   The patients plan of care was discussed with: Registered nurse.      Patient and/or family was verbally educated on the BE FAST acronym for signs/symptoms of CVA and TIA. BE FAST was written on patient's communication board  for visual education and reinforcement. All questions answered with patient indicating good understanding.      Jimmy Shannon, PT, DPT   Time Calculation: 23 mins

## 2021-06-14 NOTE — PROGRESS NOTES
0730: Bedside and Verbal shift change report given to GILDA Hernandez RN (oncoming nurse) by GABBIE Faye RN (offgoing nurse). Report included the following information SBAR, Kardex, Intake/Output, MAR, Recent Results, Med Rec Status, Cardiac Rhythm NSR, Alarm Parameters  and Dual Neuro Assessment. 1420: TRANSFER - OUT REPORT:    Verbal report given to [facility] RN (name) on Larissa Boyer  being transferred to [facility] (unit) for routine progression of care       Report consisted of patients Situation, Background, Assessment and   Recommendations(SBAR). Information from the following report(s) SBAR, Kardex, Intake/Output, MAR, Recent Results, Med Rec Status, Cardiac Rhythm NSR/SB and Alarm Parameters  was reviewed with the receiving nurse. Lines:   Peripheral IV 06/12/21 Left Antecubital (Active)   Site Assessment Clean, dry, & intact 06/14/21 1200   Phlebitis Assessment 0 06/14/21 1200   Infiltration Assessment 0 06/14/21 1200   Dressing Status Clean, dry, & intact 06/14/21 1200   Dressing Type Tape;Transparent 06/14/21 1200   Hub Color/Line Status Pink;Flushed;Patent;Capped 06/14/21 1200   Action Taken Open ports on tubing capped 06/14/21 1200   Alcohol Cap Used Yes 06/14/21 1200        Opportunity for questions and clarification was provided.       Patient transported with:   Monitor  Registered Nurse

## 2021-06-14 NOTE — PROGRESS NOTES
Neurointerventional Surgery Progress Note  Riley Price AGACNP-BC  Neurocritical Care NP      Admit Date: 6/12/2021   LOS: 2 days        Daily Progress Note: 6/14/2021      S/P: Postop day 2 cerebral angiogram for a left MCA M1 mechanical thrombectomy on 6/12/2021    HPI: Rebecca Smith is a 68 y.o. female with PMH of rheumatoid arthritis, atrial fibrillation, ITP requiring splenectomy, and intermittent cigarette smoking. Patient presented to the [facility] ER on 6/12/2021 via EMS due to sliding from her chair to the floor and not being able to get up due to right sided weakness. She reportedly fell from the chair at approximately 3:30 or 4:00 pm. She was in her usual state of health prior to this event. On arrival to the ER, she was taken for a CT of the head which showed no acute hemorrhage, mass, or infarct. Teleneurology did not want to administer tPA as patient's NIHSS was only a 2. She was taken back for a CTA of the head and neck and a proximal M1 occlusion was noted. There was no definite large completed infarction noted. Dr. Keke Chisholm with Neurointerventional was consulted and patient was flown to [facility] for endovascular intervention. On arrival to [facility] ER, the patient's symptoms had resolved, but she was at risk for deterioration due to the left M1 occlusion. Dr. Keke Chisholm discussed the risks and benefits of the procedure with the patient and she stated that she wanted to have the thrombectomy. Consent was obtained. She was taken to angio where a 10 mm thromboembolus was successfully removed from the proximal left M1 segment. Patient was transferred to the ICU for close monitoring.     Subjective:   Patient had some issues with decreased pulses in her right foot last night. JAYLAN was ordered today and was just completed this evening. She also has intermittent leg cramping in her right leg. She is doing well and ambulating.  She denies any headache, vision changes, dizziness, chest pain, shortness of breath, nausea, vomiting, numbness, weakness, speech difficulty, balance, or coordination issues.          Current Facility-Administered Medications   Medication Dose Route Frequency Provider Last Rate Last Admin    apixaban (ELIQUIS) tablet 5 mg  5 mg Oral Q12H Wendy ROGERS DO   5 mg at 06/14/21 0810    atorvastatin (LIPITOR) tablet 40 mg  40 mg Oral QHS Jill Contreras NP   40 mg at 06/13/21 2123    sodium chloride (NS) flush 5-40 mL  5-40 mL IntraVENous Q8H Jeromy Washington NP   10 mL at 06/14/21 0600    sodium chloride (NS) flush 5-40 mL  5-40 mL IntraVENous PRN Jeromy Washington NP        acetaminophen (TYLENOL) tablet 650 mg  650 mg Oral Q6H PRN Jeromy Washington NP        Or   Chris Gracia acetaminophen (TYLENOL) suppository 650 mg  650 mg Rectal Q6H PRN Jeromy Washington NP        polyethylene glycol (MIRALAX) packet 17 g  17 g Oral DAILY PRN Jeromy Washington NP        ondansetron (ZOFRAN ODT) tablet 4 mg  4 mg Oral Q8H PRN Jeromy Washington NP        Or    ondansetron [facility]) injection 4 mg  4 mg IntraVENous Q6H PRN Jeromy Washington NP        sodium chloride (NS) flush 5-40 mL  5-40 mL IntraVENous Q8H Johnson Callahan NP   10 mL at 06/14/21 0600    sodium chloride (NS) flush 5-40 mL  5-40 mL IntraVENous PRN Johnson Callahan NP        ELECTROLYTE REPLACEMENT NOTE: Nurse to review Serum Potassium and Magnesuim levels and Initiate Electrolyte Replacement Protocol as needed  1 Each Other PRN Johnson Callahan NP        ELECTROLYTE REPLACEMENT NOTE: Nurse to review Serum Phosphate levels and Initiate Electrolyte Replacement Protocol as needed  1 Each Other PRN Johnson Callahan NP        hydrOXYchloroQUINE (PLAQUENIL) tablet 200 mg  200 mg Oral DAILY Jeromy Washington NP   200 mg at 06/14/21 0810    metoprolol succinate (TOPROL-XL) XL tablet 25 mg  25 mg Oral DAILY Jeromy Washington NP   25 mg at 06/14/21 0810    sertraline (ZOLOFT) tablet 50 mg  50 mg Oral DAILY Jeromy Washington NP   50 mg at 06/14/21 0810    niCARdipine (CARDENE) 25 mg in 0.9% sodium chloride 250 mL infusion  0-15 mg/hr IntraVENous TITRATE Radha Hawkins NP   Stopped at 21 1504     Facility-Administered Medications Ordered in Other Encounters   Medication Dose Route Frequency Provider Last Rate Last Admin    sodium chloride (NS) flush 5-40 mL  5-40 mL IntraVENous Q8H Trinidad, Malgorzata I, DO        sodium chloride (NS) flush 5-40 mL  5-40 mL IntraVENous PRN [place], Malgorzata I, DO        lidocaine (PF) (XYLOCAINE) 10 mg/mL (1 %) injection 0.1 mL  0.1 mL SubCUTAneous PRN Trinidad, Malgorzata I, DO        fentaNYL citrate (PF) injection 50 mcg  50 mcg IntraVENous PRN [place], Malgorzata I, DO        midazolam (VERSED) injection 1 mg  1 mg IntraVENous PRN Trinidad, Malgorzata I, DO        lactated Ringers infusion  125 mL/hr IntraVENous CONTINUOUS Trinidad, Malgorzata I, DO        sodium chloride (NS) flush 5-40 mL  5-40 mL IntraVENous Q8H [place], Malgorzata I, DO        sodium chloride (NS) flush 5-40 mL  5-40 mL IntraVENous PRN [place], Malgorzata I, DO        oxyCODONE-acetaminophen (PERCOCET) 5-325 mg per tablet 1 Tablet  1 Tablet Oral PRN [place], Malgorzata I, DO        fentaNYL citrate (PF) injection 25 mcg  25 mcg IntraVENous Multiple [place], Malgorzata I, DO        morphine injection 2 mg  2 mg IntraVENous Multiple [place], Malgorzata I, DO        HYDROmorphone (PF) (DILAUDID) injection 0.2 mg  0.2 mg IntraVENous Multiple [place], Malgorzata I, DO        ondansetron (ZOFRAN) injection 4 mg  4 mg IntraVENous PRN [place], Malgorzata I, DO        midazolam (VERSED) injection 0.5 mg  0.5 mg IntraVENous Q5MIN PRN [place], Malgorzata I, DO            Allergies   Allergen Reactions    Levaquin [Levofloxacin] Nausea Only       Review of Systems:  Pertinent items are noted in the History of Present Illness. Objective:     Vital signs  Temp (24hrs), Av.1 °F (36.7 °C), Min:97.9 °F (36.6 °C), Max:98.3 °F (36.8 °C)   No intake/output data recorded.    1901  0700  In: .2 [P.O.:760; I.V.:1289.2]  Out: 901 [Urine:901]    Visit Vitals  BP (!) 158/87 Comment: patient was just up walking around   Pulse 68   Temp 98.3 °F (36.8 °C)   Resp 15   Ht 5' 8\" (1.727 m)   Wt 181 lb (82.1 kg)   SpO2 96%   BMI 27.52 kg/m²    O2 Flow Rate (L/min): 2 l/min O2 Device: None (Room air)     Pain control  Pain Assessment  Pain Scale 1: Numeric (0 - 10)  Pain Intensity 1: 0          Vitals:    06/14/21 1300 06/14/21 1400 06/14/21 1513 06/14/21 1751   BP: (!) 138/90 130/70 109/71 (!) 158/87   Pulse: (!) 49 (!) 54 (!) 56 68   Resp: 19 19 15 15   Temp:   98.3 °F (36.8 °C) 98.3 °F (36.8 °C)   SpO2: 95% 94% 98% 96%   Weight:       Height:            Physical Exam:  GENERAL: alert, cooperative, no distress, appears stated age  EYE: conjunctivae/corneas clear. PERRL, EOM's intact. LUNG: clear to auscultation bilaterally  HEART: regular rate and rhythm, S1, S2 normal, no murmur, click, rub or gallop  EXTREMITIES:  extremities normal, atraumatic, no cyanosis or edema, left pedal pulse +2, right pedal pulse +1, Posterior tibial pulses +1 bilaterally. (Right pedal pulse and posterior tibial pulses bilaterally faint to palpation, but present via doppler). Pulses decreased in right foot compared to left foot. SKIN: Appropriate for ethnicity. Skin semi-warm touch, but cool in toes bilaterally. Right groin site clean, dry, and intact. No hematoma, bleeding, or bruising noted. Neurologic Exam:  Mental Status:  Alert and oriented x 4. Was not able to remember why she was here, I had to remind her, but stated the current President. She appears to have some trouble with memory at times. Some delayed responses when answering orientation questions. Appropriate mood and affect. Language:    Speech fluent, but has some delayed responses, repetition, comprehension and naming intact. Cranial Nerves:        Pupils equal, round and reactive to light. Visual fields full to confrontation. Extraocular movements intact. Facial sensation intact. Full facial strength, no asymmetry. Hearing grossly intact bilaterally. No dysarthria. Tongue protrudes to midline, palate elevates symmetrically. Shoulder shrug 5/5 bilaterally. Motor:    No pronator drift. Bulk and tone normal.      5/5 power in all extremities proximally and distally. No involuntary movements. Sensation:    Sensation intact throughout to light touch. No neglect noted. Coordination & Gait: No ataxia with finger-to-nose and heel-to-shin bilaterally. Steady gait.      24 hour results:    Recent Results (from the past 24 hour(s))   METABOLIC PANEL, BASIC    Collection Time: 06/14/21  5:15 AM   Result Value Ref Range    Sodium 142 136 - 145 mmol/L    Potassium 4.1 3.5 - 5.1 mmol/L    Chloride 110 (H) 97 - 108 mmol/L    CO2 26 21 - 32 mmol/L    Anion gap 6 5 - 15 mmol/L    Glucose 98 65 - 100 mg/dL    BUN 12 6 - 20 MG/DL    Creatinine 0.69 0.55 - 1.02 MG/DL    BUN/Creatinine ratio 17 12 - 20      GFR est AA >60 >60 ml/min/1.73m2    GFR est non-AA >60 >60 ml/min/1.73m2    Calcium 8.6 8.5 - 10.1 MG/DL   MAGNESIUM    Collection Time: 06/14/21  5:15 AM   Result Value Ref Range    Magnesium 2.2 1.6 - 2.4 mg/dL   PHOSPHORUS    Collection Time: 06/14/21  5:15 AM   Result Value Ref Range    Phosphorus 2.6 2.6 - 4.7 MG/DL   CBC W/O DIFF    Collection Time: 06/14/21  5:15 AM   Result Value Ref Range    WBC 11.1 (H) 3.6 - 11.0 K/uL    RBC 4.07 3.80 - 5.20 M/uL    HGB 12.9 11.5 - 16.0 g/dL    HCT 40.3 35.0 - 47.0 %    MCV 99.0 80.0 - 99.0 FL    MCH 31.7 26.0 - 34.0 PG    MCHC 32.0 30.0 - 36.5 g/dL    RDW 13.3 11.5 - 14.5 %    PLATELET 814 105 - 040 K/uL    MPV 9.9 8.9 - 12.9 FL    NRBC 0.0 0  WBC    ABSOLUTE NRBC 0.00 0.00 - 0.01 K/uL   ECHO ADULT COMPLETE    Collection Time: 06/14/21  9:28 AM   Result Value Ref Range    IVSd 0.85 0.60 - 0.90 cm    LVIDd 4.73 3.90 - 5.30 cm    LVIDs 3.10 cm    LVOT d 1.76 cm    LVPWd 0.83 0.60 - 0.90 cm LVOT Peak Gradient 5.03 mmHg    LVOT Peak Velocity 112.10 cm/s    Left Atrium Major Axis 3.83 cm    Aortic Valve Area by Continuity of Peak Velocity 2.32 cm2    AoV PG 5.48 mmHg    Aortic Valve Systolic Peak Velocity 194.19 cm/s    MV A Nolan 79.45 cm/s    Mitral Valve E Wave Deceleration Time 187.56 ms    MV E Nolan 99.09 cm/s    Mitral Valve Pressure Half-time 54.39 ms    MVA (PHT) 4.04 cm2    Pulmonic Valve Systolic Peak Instantaneous Gradient 1.94 mmHg    Triscuspid Valve Regurgitation Peak Gradient 37.26 mmHg    TR Max Velocity 305.22 cm/s    Ao Root D 2.66 cm    MV E/A 1.25     LV Mass .7 67.0 - 162.0 g    LV Mass AL Index 67.2 43.0 - 95.0 g/m2    Left Atrium Minor Axis 1.95 cm    CHRIS/BSA Pk Nolan 1.2 cm2/m2   ANKLE BRACHIAL INDEX    Collection Time: 06/14/21  5:27 PM   Result Value Ref Range    Left anterior tibial 155 mmHg    Left posterior tibial 156 mmHg    Right anterior tibial 83 mmHg    Right posterior tibial 84 mmHg    Left arm  mmHg    Right arm  mmHg    Left toe pressure 166 mmHg    Right toe pressure 88 mmHg    Left JAYLAN 1.05     Right JAYLAN 0.56     Left TBI 1.11     Right TBI 0.59           Imaging:  CT Results  (Last 48 hours)               06/13/21 0315  CT HEAD WO CONT Final result    Impression:  No acute intracranial process. No evidence of intracranial hemorrhage status post thrombectomy. Imaging findings consistent with mild chronic microvascular ischemic change. There is a mild to moderate degree of cerebral atrophy. Narrative:  CLINICAL HISTORY: s/p cva with thrombectomy   INDICATION: s/p cva with thrombectomy   COMPARISON: 6/12/2021. CT dose reduction was achieved through use of a standardized protocol tailored   for this examination and automatic exposure control for dose modulation.     TECHNIQUE: Serial axial images with a collimation of 5 mm were obtained from the   skull base through the vertex     FINDINGS:    There is sulcal and ventricular prominence. Confluent periventricular and   scattered foci of hypodensity in the cerebral white matter. There is no evidence   of an acute infarction, hemorrhage, or mass-effect. There is no evidence of   midline shift or hydrocephalus. Posterior fossa structures are unremarkable. No   extra-axial collections are seen. Mastoid air cells are well pneumatized and clear. There is no evidence of depressed skull fractures of soft tissue swelling.           06/12/21 1934  CTA CODE NEURO HEAD AND NECK W CONT Preliminary result    Narrative:  PRELIMINARY REPORT       Proximal M1 occlusion. No definite large completed infarction left MCA territory. Preliminary report was provided by Dr. Zahira Washburn, the on-call radiologist, at   7:41 PM       Final report to follow. END PRELIMINARY REPORT                               06/12/21 1827  CT CODE NEURO HEAD WO CONTRAST Final result    Impression:   Head CT on 6/13/2021 at 315 shows no acute intracranial hemorrhage, mass or infarct. Narrative:  EXAM: Brain CT without contrast.       INDICATION: Code Stroke        TECHNIQUE: Noncontrast CT of the brain is performed with 5 mm collimation. Bone   algorithm axial and sagittal and coronal reconstructions performed and   evaluated. CT dose reduction was achieved through use of a standardized   protocol tailored for this examination and automatic exposure control for dose   modulation. COMPARISON: None       FINDINGS: There is no acute intracranial hemorrhage, mass, mass effect or   herniation. Ventricular system is normal. The gray-white matter differentiation   is well-preserved. The mastoid air cells are well pneumatized. The visualized   paranasal sinuses are normal.             Head CT on 6/13/2021 at 315 shows  IMPRESSION  No acute intracranial process. No evidence of intracranial hemorrhage status post thrombectomy. I    MRI of brain on 6/13/2021 at 12:34 PM shows  IMPRESSION  1.  Small acute infarct in the left posterior corona radiata/basal ganglia. 2. Additional tiny acute infarcts in the left frontal lobe, bilateral occipital  lobes, and left inferior cerebellum. 3. Mild chronic microvascular ischemic disease. Small chronic infarct in the  left inferior cerebellum. Assessment:     Active Problems:    Ischemic stroke ([address] (6/12/2021)        Plan:    Multiple acute ischemic infarcts due to left MCA occlusion  - Status post left MCA mechanical thrombectomy by Dr. Rosy Noonan on 6/12/2021  - Repeat head CT this morning shows no acute intracranial process. No evidence of intracranial hemorrhage status post thrombectomy. Imaging findings consistent with mild chronic microvascular ischemic change. There is a mild to moderate degree of cerebral atrophy.  - MRI of brain shows small acute infarct in the left posterior corona radiata/basal ganglia, Additional tiny acute infarcts in the left frontal lobe, bilateral occipital lobes, and left inferior cerebellum consistent with embolic mechanism likely from   Atrial fibrillation. Mild chronic microvascular ischemic disease. Small chronic infarct in the left inferior cerebellum. - neuro checks every 4 hours  - continue Eliquis 5 mg BID in the setting of atrial fibrillation  - ECHO shows EF 55-60%, no significant abnormalities  - Hemoglobin A1c ok at 5.9  - SBP goal 110-140 status post thrombectomy, on scheduled home dose of metoprolol 25 mg daily  - Lipid panel shows LDL 87.8, goal less than 70, continue Lipitor 40 mg nightly  - PT/OT/SLP evals  - Stroke education  - Neurology following   - NIS signing off, please call with any question or concerns     Hx of Paroxysmal Atrial fibrillation   - continue Eliquis as stated above     Decreased pulses in right foot  - JAYLAN preliminary report shows moderate right lower extremity arterial obstruction.  No evidence of hemodynamically significant left lower extremity arterial obstruction  - Consult vascular surgery for further evaluation      Activity: Up with assistance  DVT ppx: SCDs  Dispo: TBD    Plan d/w Dr. Alexa Watts, Dr. Femi Davis, RN, patient, and family.        Noam Ball NP

## 2021-06-14 NOTE — PROGRESS NOTES
Rounded on pt in ICU 14. NIH 0. No complaints at this time. Groin site dressing removed, scant amount of dried blood. Groin site soft w/o drainage or pain. Right DP and PT pulse unable to be palpated. Cap refill <3 sec, appropriate color. Right foot slightly cooler than left foot, but with good sensation. Pulse able to be heard via doppler. RN reports pulses in right foot have been intermittently difficult to find since admission. Left pulses clearly palpated 2+. Upon further questioning cramping pain in left leg x 3 weeks PTA waking her at night. Was treated by PCP for sciatica with prednisone but experienced no improvement. Reports pain resolves with rest and massage of the right leg with aspercream.       D/w Dr. Dannial Merlin. JAYLAN ordered for the morning.        Julio Thomas NP  Neurocritical Care

## 2021-06-14 NOTE — PROGRESS NOTES
SPEECH PATHOLOGY BEDSIDE SWALLOW EVALUATION/DISCHARGE  Patient: Cuong Lal (19 y.o. female)  Date: 6/14/2021  Primary Diagnosis: Ischemic stroke ([facility] [address] [I63.9]       Precautions: fall       ASSESSMENT :  Based on the objective data described below, the patient presents with functional oropharyngeal swallow with no difficulties or s/s of aspiration appreciated with any consistenceies. Pt is tolerating a regular diet and denies any speech or swallowing concerns. Recommend continue with regular diet/thin liquids with general aspiration precautions as outlined below. Skilled acute therapy provided by a speech-language pathologist is not indicated at this time. SLP will sign off. PLAN :  Recommendations:  -- regular diet/thin liquids  -- general aspiration precautions including completely upright for all PO  -- SLP will sign off  Discharge Recommendations: None     SUBJECTIVE:   Patient stated I love a good cup of tea. My favorite tea is red she tea, it's a black tea.     OBJECTIVE:     Past Medical History:   Diagnosis Date    Menopause     Osteopenia 11/6/2015    PAC (premature atrial contraction)     PAT (paroxysmal atrial tachycardia) (HCC)     Rheumatoid arthritis (HCC)      Past Surgical History:   Procedure Laterality Date    HX APPENDECTOMY      spleen    HX HYSTERECTOMY      HX OOPHORECTOMY      HX SHOULDER ARTHROSCOPY      HX SPLENECTOMY       Prior Level of Function/Home Situation:   Home Situation  Home Environment: Private residence  # Steps to Enter: 4  Rails to Enter: Yes  Hand Rails : Bilateral  Living Alone: Yes  Support Systems: Spouse/Significant Other/Partner  Current DME Used/Available at Home: Grab bars  Tub or Shower Type: Shower  Diet prior to admission: regular/thin  Current Diet:  Regular/thin   Cognitive and Communication Status:  Neurologic State: Alert  Orientation Level: Oriented X4  Cognition: Appropriate decision making, Follows commands  Perception: Appears intact  Perseveration: No perseveration noted  Safety/Judgement: Awareness of environment  Oral Assessment:  Oral Assessment  Labial: No impairment  Dentition: Intact; Natural  Oral Hygiene: moist oral mucosa  Lingual: No impairment  Velum: No impairment  Mandible: No impairment  P.O. Trials:  Patient Position: upright in bed  Vocal quality prior to P.O.: No impairment  Consistency Presented: Thin liquid;Puree; Solid  How Presented: Self-fed/presented     Bolus Acceptance: No impairment  Bolus Formation/Control: No impairment     Propulsion: No impairment  Oral Residue: None  Initiation of Swallow: No impairment  Laryngeal Elevation: Functional  Aspiration Signs/Symptoms: None  Pharyngeal Phase Characteristics: No impairment, issues, or problems              Oral Phase Severity: No impairment  Pharyngeal Phase Severity : No impairment  NOMS:   The NOMS functional outcome measure was used to quantify this patient's level of swallowing impairment. Based on the NOMS, the patient was determined to be at level 7 for swallow function     NOMS Swallowing Levels:  Level 1 (CN): NPO  Level 2 (CM): NPO but takes consistency in therapy  Level 3 (CL): Takes less than 50% of nutrition p.o. and continues with nonoral feedings; and/or safe with mod cues; and/or max diet restriction  Level 4 (CK): Safe swallow but needs mod cues; and/or mod diet restriction; and/or still requires some nonoral feeding/supplements  Level 5 (CJ): Safe swallow with min diet restriction; and/or needs min cues  Level 6 (CI): Independent with p.o.; rare cues; usually self cues; may need to avoid some foods or needs extra time  Level 7 ([address] [place] [address]): Independent for all p.o.  ARLYN. (2003). National Outcomes Measurement System (NOMS): Adult Speech-Language Pathology User's Guide.        Pain:  Pain Scale 1: Numeric (0 - 10)  Pain Intensity 1: 0     After treatment:   Patient left in no apparent distress in bed, Call bell within reach and Nursing notified    COMMUNICATION/EDUCATION:   Patient was educated regarding her functional oropharyngeal swallow as this relates to her diagnosis of CVA. She demonstrated Good understanding as evidenced by appropriate engagement and verbalization of agreement. The patient's plan of care including recommendations, planned interventions, and recommended diet changes were discussed with: Registered nurse.      Thank you for this referral.  Andres Burk M.S. CF-SLP   Time Calculation: 6 mins

## 2021-06-14 NOTE — PROGRESS NOTES
Neurology Progress Note  Rebekha Maldonado NP    Admit Date: 6/12/2021   LOS: 2 days      Daily Progress Note: 6/14/2021    HPI: Keysha Schulte a 68 y. o. female with PMH of rheumatoid arthritis, atrial fibrillation, ITP requiring splenectomy, and intermittent cigarette smoking.  Patient presented to the [facility] ER on 6/12/2021 via EMS due to sliding from her chair to the floor and not being able to get up due to right sided weakness. She reportedly fell from the chair at approximately 3:30 or 4:00 pm. She was in her usual state of health prior to this event. On arrival to the ER, she was taken for a CT of the head which showed no acute hemorrhage, mass, or infarct. Teleneurology did not want to administer tPA as patient's NIHSS was only a 2. She was taken back for a CTA of the head and neck and a proximal M1 occlusion was noted. There was no definite large completed infarction noted. Dr. Darren Carrel with Neurointerventional was consulted and patient was flown to [facility] for endovascular intervention. On arrival to [facility] ER, the patient's symptoms had resolved, but she was at risk for deterioration due to the left M1 occlusion.  Dr. Darren Carrel discussed the risks and benefits of the procedure with the patient and she stated that she wanted to have the thrombectomy. Consent was obtained. She was taken to angio where a 10 mm thromboembolus was successfully removed from the proximal left M1 segment. Patient was transferred to the ICU for close monitoring. Subjective:   No neuro events overnight. Unequal pedal pulses with associated 3 week hx gradually worsening right leg pain resolved with rest and massage with aspercream. Discussed with pt- JAYLAN ordered for the morning. Denies numbness, tingling, chest pain, nausea, vomiting, difficulty swallowing, headache, and dyspnea.      Allergies   Allergen Reactions    Levaquin [Levofloxacin] Nausea Only       Review of Systems:  Pertinent items are noted in the History of Present Illness. Objective:   Vital signs  Temp (24hrs), Av.2 °F (36.8 °C), Min:97.7 °F (36.5 °C), Max:98.7 °F (37.1 °C)   No intake/output data recorded. 701 - 1900  In: 2049.2 [P.O.:760; I.V.:1289.2]  Out: 901 [Urine:901]  Visit Vitals  /73   Pulse (!) 51   Temp 98.2 °F (36.8 °C)   Resp 21   Wt 84.5 kg (186 lb 4.6 oz)   SpO2 95%   BMI 28.75 kg/m²    O2 Flow Rate (L/min): 2 l/min O2 Device: None (Room air)   Vitals:    21 1900 21 2100 21 2200   BP: 123/61 135/68 124/66 139/73   Pulse: (!) 57 60 (!) 54 (!) 51   Resp:    Temp:  98.2 °F (36.8 °C)     SpO2: 95% 95% 94% 95%   Weight:            Physical Exam:  GENERAL: Calm, cooperative, NAD  SKIN: Dry, color appropriate for ethnicity. Groin site soft, with no odor, bleeding or drainage noted. Mild ecchymosis present. Right foot slightly cooler than left foot. Cap refill <3 sec bilaterally in feet. EXTREMITIES: Atraumatic, no cyanosis or edema. Left DP/PT 2+. Right DP/PT unable to be palpated, located via doppler. Neurologic Exam:  Mental Status:  Alert and oriented x 4. Appropriate affect, mood and behavior. Language:    Normal fluency, repetition, comprehension and naming. Cranial Nerves:   Pupils 3 mm, equal, round and reactive to light. Visual fields full to confrontation. Extraocular movements intact. Facial sensation intact. Full facial strength, no asymmetry. Hearing grossly intact bilaterally. No dysarthria. Tongue protrudes to midline, palate elevates symmetrically. Shoulder shrug 5/5 bilaterally. Motor:    No pronator drift. Bulk and tone normal.      5/5 power in all extremities proximally and distally. No involuntary movements. Sensation:    Sensation intact throughout to light touch. Coordination & Gait: Gait deferred. FTN and HTS intact with no ataxia present.     NIHSS:      1a-LOC:0    1b-Month/Age:0    1c-Open/Close Hand:0    2-Best Gaze:0    3-Visual Fields:0    4-Facial Palsy:0    5a-Left Arm:0    5b-Right Arm:0    6a-Left Le    6b-Right Le    7-Limb Ataxia:0    8-Sensory:0    9-Best Language:0    10-Dysarthria:0    11-Extinction/Inattention:0  TOTAL SCORE:0    Labs:  Lab Results   Component Value Date/Time    WBC 13.8 (H) 2021 05:07 AM    HGB 13.4 2021 05:07 AM    HCT 41.7 2021 05:07 AM    PLATELET 660  05:07 AM    MCV 97.7 2021 05:07 AM     Lab Results   Component Value Date/Time    Sodium 140 2021 05:07 AM    Potassium 3.9 2021 05:07 AM    Chloride 109 (H) 2021 05:07 AM    CO2 26 2021 05:07 AM    Anion gap 5 2021 05:07 AM    Glucose 108 (H) 2021 05:07 AM    BUN 11 2021 05:07 AM    Creatinine 0.61 2021 05:07 AM    BUN/Creatinine ratio 18 2021 05:07 AM    GFR est AA >60 2021 05:07 AM    GFR est non-AA >60 2021 05:07 AM    Calcium 8.8 2021 05:07 AM     Imaging:  MRI Results (most recent):  Results from Hospital Encounter encounter on 21    MRI BRAIN WO CONT    Narrative  **PRELIMINARY REPORT**    Exam: MRA brain without contrast    INDICATION: Status post left M1 thrombectomy for stroke. Preliminary findings: Foci of acute or subacute ischemia in the left posterior  paraventricular deep white matter, left basal ganglia region and left posterior  temporal subcortical white matter. Suspected focus of acute or subacute ischemia  in the right occipital cortex and additional in the right occipital subcortical  white matter. Possible similar focus in the left peripheral cerebellum. Scattered foci of T2 and FLAIR hyperintensity in these and additional areas  consistent with microvascular ischemic change. No acute hemorrhage obvious. Preliminary report was provided by Dr. Quang Avitia, the on-call radiologist, at  [phone] hours    Final report to follow.     **END PRELIMINARY REPORT**          Results from [facility] Encounter encounter on 06/12/21    CTA CODE NEURO HEAD AND NECK W CONT    Narrativ  **PRELIMINARY REPORT**    Proximal M1 occlusion. No definite large completed infarction left MCA territory. Preliminary report was provided by Dr. Nisha Dixon, the on-call radiologist, at  7:41 PM    Final report to follow. **END PRELIMINARY REPORT**    CLINICAL HISTORY: Code stroke    EXAMINATION:  CT ANGIOGRAPHY HEAD AND NECK    COMPARISON: CT head 6/12/2021    TECHNIQUE:  Following the uneventful administration of iodinated contrast  material, axial CT angiography of the head and neck was performed. Delayed axial  images through the head were also obtained. Coronal and sagittal reconstructions  were obtained. Manual postprocessing of images was performed. 3-D  Sagittal  maximal intensity projection images were obtained. 3-D Coronal maximal  intensity projections were obtained. CT dose reduction was achieved through use  of a standardized protocol tailored for this examination and automatic exposure  control for dose modulation. FINDINGS:    Delayed contrast-enhanced head CT:    No evidence for acute intracranial hemorrhage or midline shift. The basal  cisterns are patent. Early ischemic changes versus hypoperfusion in the left  cerebral hemisphere. CTA NECK:    Great vessels: Normal arch anatomy with the origins patent. Right subclavian artery: Patent    Left subclavian artery: Patent    Right common carotid artery: Patent    Left common carotid artery: Patent    Cervical right internal carotid artery: Patent with mild proximal  atherosclerosis causing no significant stenosis by NASCET criteria. Cervical left internal carotid artery: Patent with mild proximal atherosclerosis  causing no significant stenosis by NASCET criteria. Right vertebral artery: Patent    Left vertebral artery: Patent    Moderate cervical spondylosis.  Mild emphysema    CTA HEAD:    Right cavernous internal carotid artery: Patent with mild atherosclerosis    Left cavernous internal carotid artery: Patent with mild atherosclerosis    Anterior cerebral arteries: Patent    Anterior communicating artery: Patent    Right middle cerebral artery: Patent    Left middle cerebral artery: Acute thromboembolic occlusion of the M1 segment  with reconstitution distally    Posterior communicating arteries: Hypoplastic    Posterior cerebral arteries: Patent    Basilar artery: Patent    Distal vertebral arteries: Patent    Measurements use NASCET criteria. Impression  Acute thromboembolic occlusion of the M1 segment left middle cerebral artery. Other findings as described above      CT CODE NEURO HEAD WO CONTRAST    Narrative  EXAM: Brain CT without contrast.    INDICATION: Code Stroke    TECHNIQUE: Noncontrast CT of the brain is performed with 5 mm collimation. Bone  algorithm axial and sagittal and coronal reconstructions performed and  evaluated. CT dose reduction was achieved through use of a standardized  protocol tailored for this examination and automatic exposure control for dose  modulation. COMPARISON: None    FINDINGS: There is no acute intracranial hemorrhage, mass, mass effect or  herniation. Ventricular system is normal. The gray-white matter differentiation  is well-preserved. The mastoid air cells are well pneumatized. The visualized  paranasal sinuses are normal.    Impression  No acute intracranial hemorrhage, mass or infarct.     Assessment:   Active Problems:    Ischemic stroke ([address] (6/12/2021)      Plan:   Acute Left MCA Ischemic CVA   - As seen on MRI 6/13/21- multiple foci of acute ischemia in L BG, L paraventricular deep white matter, L temporal subcortical white matter, R occipital cortex, L cerebellum  - Status post left M1 mechanical thrombectomy by Dr. Chiquis Luis on 6/12/2021  - Likely underlying afib source of CVA- Eliquis 5mg BID  - NIHSS 0 on exam today  - Q4 hour neuro checks    - ECHO pending  - Hemoglobin A1c ok at 5.9  - SBP goal 110-140 status post thrombectomy, Cardene PRN, on scheduled home dose of metoprolol 25 mg daily  - LDL 87.8, LDL goal <70, Begin Lipitor 40mg daily  - PT/OT/SLP evals; skilled need not anticipated  - Stroke education  - Counseled on smoking cessation  - Decreased right pedal/PT pulses with hx of pain suspicious for claudication- JAYLAN ordered for the morning   - Ok to transfer out of ICU from neuro standpoint        Plan discussed with Dr. Ira Luna, the patient and the primary RN. The patient verbalized understanding of the current plan of care and is in agreement. Dale Farley NP  Neurocritical Care Nurse Practitioner    Neurology Staff Addendum:    I have reviewed the documentation provided by the nurse practitioner, discussed her findings, clinical impression, and the proposed management plans with regards to this encounter. I have personally evaluated the patient and verified the history and confirmed the physical findings. Below are my additional findings:    68year old female with a h/o Afib, RA, ITP, tobacco use presenting with right hemiparesis with associated proximal L M1 occlusion on CTA s/p endovascular intervention. Examination is presently non-focal apart from some mild disorientation to date, recall deficits (?baseline MCI). MRI Brain independently reviewed with small scattered acute infarcts involving the anterior and posterior circulation c/w embolic mechanism likely associated with her atrial fibrillation. Unfortunately she has been non-compliant with NOAC in the past due to concerns regarding side effects. Discussed importance of mediation compliance for future stroke prevention. Will continue Eliquis along with statin therapy. Goal SBP<140, LDL<70. Stroke education. No anticipated skilled needs. May initiate discharge planning. Please call if questions.      Gisell De La Paz, DO  06/14/21

## 2021-06-14 NOTE — PROGRESS NOTES
[facility]    ICU TEAM Progress Note    Name: Kwaku Frazier   : 1940   MRN: [id number]   Date: 2021           ICU Assessment     1. Left CVA  2. S/p Left MCA M1 thrombectomy               ICU Comprehensive Plan of Care:     1. Neurological System  Neuro intact this AM  Spontaneous Awakening Trial: N/A  Sedation: N/A  Analgesia: None    2. Cardiovascular System  Keep -180  SBP Goal of: < 180 mmHg  MAP Goal of: > 65 mmHg  None - For above SBP/MAP goals  Transfusion Trigger (Hgb): <7 g/dL  Keep K > 4; Mg > 2     3. Respiratory System  N/A  Spontaneous Breathing Trial: N/A  Pulmonary toilet: Incentive Spirometry   SpO2 Goal: > 92%   DVT Prophylaxis: SCD's or Sequential Compression Device     4. Renal/GI/Endocrine System  IVFs: TKO  Ulcer Prophylaxis: Not at this time   Bowel Regimen: None needed at this time  Feeding:  No    Blood Sugar Goal 120-180 - Glycemic Control: Insulin    5. Infectious Disease  No issues  Indwelling Catheter:  Tubes: None  Lines: Peripheral IV  Drains: None  D - De-escalation of Antibiotics:   none  COVID Treatment:  N/A     6. PT/OT: PT consulted and on board and OT consulted and on board     7. Goals of Care Discussion with family Yes     8. Plan of Care/Code Status: Full Code    9. Discussed Care Plan with Bedside RN    10. Documentation of Current Medications    Subjective:   Progress Note: 2021      Reason for ICU Admission:  Left CVA     HPI: Please refer to H&P     Overnight Events:   2021  No acute overnight events    POD:  #2 Left MCA M1 thrombectomy    S/P:   Doing well. Stable to transfer from an ICU stand point.       Active Problem List:     Problem List  Date Reviewed: 2021        Codes Class    Right sided weakness ICD-10-CM: R53.1  ICD-9-CM: 728.87         Ischemic stroke ([facility] [address] ICD-10-CM: I63.9  ICD-9-CM: 434.91         Stroke (cerebrum) ([facility] ICD-10-CM: I63.9  ICD-9-CM: 434.91         Palpitations ICD-10-CM: R00.2  ICD-9-CM: 785.1         PAT (paroxysmal atrial tachycardia) ([facility]) ICD-10-CM: I47.1  ICD-9-CM: 427.0         Rheumatoid arthritis with positive rheumatoid factor ([facility]) ICD-10-CM: M05.9  ICD-9-CM: 714.0         Osteopenia ICD-10-CM: M85.80  ICD-9-CM: 733.90         MARLY (generalized anxiety disorder) ICD-10-CM: F41.1  ICD-9-CM: 300.02         Smoker ICD-10-CM: F17.200  ICD-9-CM: 305.1               Past Medical History:      has a past medical history of Menopause, Osteopenia (11/6/2015), PAC (premature atrial contraction), PAT (paroxysmal atrial tachycardia) ([facility] [address] and Rheumatoid arthritis ([facility] Past Surgical History:      has a past surgical history that includes hx appendectomy; hx hysterectomy; hx splenectomy; hx shoulder arthroscopy; and hx oophorectomy. Home Medications:     Prior to Admission medications    Medication Sig Start Date End Date Taking? Authorizing Provider   metoprolol succinate (TOPROL-XL) 25 mg XL tablet Take 1 tablet by mouth once daily 12/15/20   Fidencio Bravo MD   diclofenac (VOLTAREN) 1 % gel Apply  to affected area four (4) times daily. Patient not taking: Reported on 6/12/2021    Provider, Historical   ibuprofen 200 mg cap Take  by mouth as needed. Provider, Historical   ergocalciferol (VITAMIN D2) 50,000 unit capsule Take 50,000 Units by mouth every seven (7) days. Provider, Historical   sertraline (ZOLOFT) 50 mg tablet Take 1 Tab by mouth daily. 8/30/16   Chris Escalante MD   hydroxychloroquine (PLAQUENIL) 200 mg tablet Take 1 Tab by mouth daily. 6/22/16   Chris Escalante MD   diphenhydrAMINE (BENADRYL) 25 mg tablet Take 25 mg by mouth nightly. Patient not taking: Reported on 6/12/2021    Provider, Historical       Allergies/Social/Family History:      Allergies   Allergen Reactions    Levaquin [Levofloxacin] Nausea Only      Social History     Tobacco Use    Smoking status: Current Every Day Smoker     Packs/day: 1.00     Years: 30.00     Pack years: 30.00     Types: Cigarettes     Last attempt to quit: 2016     Years since quittin.1    Smokeless tobacco: Never Used   Substance Use Topics    Alcohol use: Yes     Alcohol/week: 7.0 standard drinks     Types: 7 Standard drinks or equivalent per week      Family History   Problem Relation Age of Onset    Breast Cancer Mother     Breast Cancer Sister     Diabetes Father     Heart Disease Father        Review of Systems:     A comprehensive review of systems was negative. Objective:   Vital Signs:  Visit Vitals  /60   Pulse 78   Temp 97.9 °F (36.6 °C)   Resp 14   Ht 5' 8\" (1.727 m)   Wt 82.1 kg (181 lb)   SpO2 95%   BMI 27.52 kg/m²    O2 Flow Rate (L/min): 2 l/min O2 Device: None (Room air) Temp (24hrs), Av.1 °F (36.7 °C), Min:97.7 °F (36.5 °C), Max:98.7 °F (37.1 °C)           Intake/Output:     Intake/Output Summary (Last 24 hours) at 2021 0904  Last data filed at 2021 1700  Gross per 24 hour   Intake 1024.17 ml   Output 901 ml   Net 123.17 ml       Physical Exam:    Visit Vitals  /60   Pulse 78   Temp 97.9 °F (36.6 °C)   Resp 14   Ht 5' 8\" (1.727 m)   Wt 82.1 kg (181 lb)   SpO2 95%   BMI 27.52 kg/m²     General:  Alert, cooperative, no distress, appears stated age. Head:  Normocephalic, without obvious abnormality, atraumatic. Eyes:  Conjunctivae/corneas clear. PERRL, EOMs intact. Fundi benign. Ears:  Normal TMs and external ear canals both ears. Nose: Nares normal. Septum midline. Mucosa normal. No drainage or sinus tenderness. Throat: Lips, mucosa, and tongue normal. Teeth and gums normal.   Neck: Supple, symmetrical, trachea midline, no adenopathy, thyroid: no enlargement/tenderness/nodules, no carotid bruit and no JVD. Back:   Symmetric, no curvature. ROM normal. No CVA tenderness. Lungs:   Clear to auscultation bilaterally. Chest wall:  No tenderness or deformity. Heart:  Regular rate and rhythm, S1, S2 normal, no murmur, click, rub or gallop.    Breast Exam:  No tenderness, masses, or nipple abnormality. Abdomen:   Soft, non-tender. Bowel sounds normal. No masses,  No organomegaly. Extremities: Extremities normal, atraumatic, no cyanosis or edema. Pulses: 2+ and symmetric all extremities. Skin: Skin color, texture, turgor normal. No rashes or lesions. Lymph nodes: Cervical, supraclavicular, and axillary nodes normal.   Neurologic: CNII-XII intact. Normal strength, sensation and reflexes throughout. LABS AND  DATA: Personally reviewed  Recent Labs     06/14/21  0515 06/13/21  0507   WBC 11.1* 13.8*   HGB 12.9 13.4   HCT 40.3 41.7    270     Recent Labs     06/14/21  0515 06/13/21  0507    140   K 4.1 3.9   * 109*   CO2 26 26   BUN 12 11   CREA 0.69 0.61   GLU 98 108*   CA 8.6 8.8   MG 2.2 2.2   PHOS 2.6 3.3     Recent Labs     06/12/21  1833   AP 98   TP 6.7   ALB 3.3*   GLOB 3.4     Recent Labs     06/13/21  0507 06/12/21  1833   INR 1.0 1.1   PTP 10.7 10.6   APTT 21.7*  --       No results for input(s): PHI, PCO2I, PO2I, FIO2I in the last 72 hours. Recent Labs     06/12/21  1833   TROIQ <0.05       Hemodynamics:   PAP:   CO:     Wedge:   CI:     CVP:    SVR:       PVR:       Ventilator Settings:  Mode Rate Tidal Volume Pressure FiO2 PEEP                    Peak airway pressure:      Minute ventilation:          MEDS: Reviewed    Chest X-Ray:  CXR Results  (Last 48 hours)               06/12/21 1926  XR CHEST PORT Final result    Impression:  No acute process identified. Narrative:  Clinical history: CVA   INDICATION:   CVA   COMPARISON: None       FINDINGS:   AP portable upright view of the chest demonstrates a stable  cardiopericardial   silhouette. There is no pleural effusion. .There is no focal consolidation. .Left   shoulder arthroplasty. . Patient is on a cardiac monitor.                     ECHO:        Multidisciplinary Rounds Completed:  Pending    ABCDEF Bundle/Checklist Completed:  Yes    SPECIAL EQUIPMENT  None    DISPOSITION  Transfer to non-ICU bed    CRITICAL CARE CONSULTANT NOTE  I had a face to face encounter with the patient, reviewed and interpreted patient data including clinical events, labs, images, vital signs, I/O's, and examined patient. I have discussed the case and the plan and management of the patient's care with the consulting services, the bedside nurses and the respiratory therapist.      NOTE OF PERSONAL INVOLVEMENT IN CARE   This patient has a high probability of imminent, clinically significant deterioration, which requires the highest level of preparedness to intervene urgently. I participated in the decision-making and personally managed or directed the management of the following life and organ supporting interventions that required my frequent assessment to treat or prevent imminent deterioration. I personally spent 30 minutes of critical care time. This is time spent at this critically ill patient's bedside actively involved in patient care as well as the coordination of care. This does not include any procedural time which has been billed separately.     [id number] [facility] Critical [facility] Emergency Department  6/14/2021

## 2021-06-14 NOTE — PROGRESS NOTES
[facility]  Hospitalist Group     ICU Transfer/Accept Summary     This patient is being [place] [address] ICU  DATE OF TRANSFER: 6/14/2021       PATIENT ID: Sandra Garcia  MRN: [id number]   YOB: 1940    PRIMARY CARE PROVIDER: Ellen TINSLEY MD   DATE OF ADMISSION: 6/12/2021  9:14 PM    ATTENDING PHYSICIAN: Brissa Gastelum NP  CONSULTATIONS:   IP CONSULT TO NEUROINTERVENTIONAL SURGERY    PROCEDURES/SURGERIES:   * No surgery found *    REASON FOR ADMISSION: <principal problem not specified>     HOSPITAL PROBLEM LIST:  Patient Active Problem List   Diagnosis Code    Rheumatoid arthritis with positive rheumatoid factor ([facility]) M05.9    Osteopenia M85.80    MARLY (generalized anxiety disorder) F41.1    Smoker F17.200    Palpitations R00.2    PAT (paroxysmal atrial tachycardia) ([facility]) I47.1    Right sided weakness R53.1    Ischemic stroke ([facility]) I63.9    Stroke (cerebrum) (Formerly [facility]) I63.9         Brief HPI and Hospital Course:       HPI: Patient is a 68year old female with known past medical history of menopause, osteopenia, paroxysmal atrial tachycardia and rheumatoid arthritis who presented to ED at outside hospital with sudden onset right sided weakness and fall from a chair at home. She was able to call EMS who took her to the ED where her CTA revealed a right M1 occlusion. She was brought to [facility] via helicopter and went to angio with Dr. Margaux Chaparro who was able to perform thrombectomy to the vessel. Patient is now in ICU for recovery and post thrombectomy care. She still has some diminished sensation to the right lower extremity and some right sided weakness 4/5. No facial droop appreciated. She states her symptoms are better now. Offers not other complaints. 06/14: Seen and examined patient sitting up in bed states that she is feeling fine. But feels like it has been  very busy morning and she just wants to sleep. She is awake alert and oriented x3.   Moves all extremities with equal strength. Patient to transfer out of the ICU today. Assessment and Plan:    CVA, left MCA  S/p mechanical thrombectomy by neurosurgery  Continue neurochecks  MRI completed  Echo completed  PT OT following  SBP goal 043609 s/p thrombectomy  A1c 5.9  LDL 87.8, goal < 70. Continue atorvastatin  Neurology following    Hypertension  SBP goal 100140  Continue metoprolol  Monitor vital signs    Rheumatoid arthritis  Continue Plaquenil    General Anxiety Disorder  Continue Zoloft    Tobacco abuse  Smoking cessation counseling provided          PHYSICAL EXAMINATION:  Visit Vitals  /63   Pulse (!) 54   Temp 97.9 °F (36.6 °C)   Resp 20   Ht 5' 8\" (1.727 m)   Wt 82.1 kg (181 lb)   SpO2 95%   BMI 27.52 kg/m²       General:          Alert, cooperative, no distress  HEENT:           Atraumatic, MMM            Neck:               Supple, symmetrical,  thyroid: non tender  Lungs:             Clear to auscultation bilaterally. No Wheezing or Rhonchi. No rales. Heart:              Regular  rhythm,  No  murmur   No edema  Abdomen:       Soft, non-tender. Not distended. Bowel sounds normal  Extremities:     No cyanosis. No clubbing,  +2 distal pulses  Skin:                Not pale. Not Jaundiced  No rashes   Psych:             Not anxious or agitated.   Neurologic:      Alert, moves all extremities, oriented X 3.     CODE STATUS:  X Full Code    DNR    Partial    Comfort Care     Signed:   Ceasar Meadows NP  Date of Service:  6/14/2021  12:47 PM

## 2021-06-15 VITALS
SYSTOLIC BLOOD PRESSURE: 122 MMHG | WEIGHT: 178.35 LBS | TEMPERATURE: 97.8 F | DIASTOLIC BLOOD PRESSURE: 63 MMHG | HEIGHT: 68 IN | BODY MASS INDEX: 27.03 KG/M2 | OXYGEN SATURATION: 96 % | RESPIRATION RATE: 16 BRPM | HEART RATE: 70 BPM

## 2021-06-15 LAB
ANION GAP SERPL CALC-SCNC: 7 MMOL/L (ref 5–15)
BASOPHILS # BLD: 0 K/UL (ref 0–0.1)
BASOPHILS NFR BLD: 0 % (ref 0–1)
BUN SERPL-MCNC: 13 MG/DL (ref 6–20)
BUN/CREAT SERPL: 20 (ref 12–20)
CALCIUM SERPL-MCNC: 8.6 MG/DL (ref 8.5–10.1)
CHLORIDE SERPL-SCNC: 108 MMOL/L (ref 97–108)
CO2 SERPL-SCNC: 25 MMOL/L (ref 21–32)
CREAT SERPL-MCNC: 0.65 MG/DL (ref 0.55–1.02)
DIFFERENTIAL METHOD BLD: ABNORMAL
EOSINOPHIL # BLD: 0.1 K/UL (ref 0–0.4)
EOSINOPHIL NFR BLD: 1 % (ref 0–7)
ERYTHROCYTE [DISTWIDTH] IN BLOOD BY AUTOMATED COUNT: 13.3 % (ref 11.5–14.5)
GLUCOSE SERPL-MCNC: 101 MG/DL (ref 65–100)
HCT VFR BLD AUTO: 40.8 % (ref 35–47)
HGB BLD-MCNC: 13 G/DL (ref 11.5–16)
IMM GRANULOCYTES # BLD AUTO: 0.1 K/UL (ref 0–0.04)
IMM GRANULOCYTES NFR BLD AUTO: 1 % (ref 0–0.5)
LEFT ABI: 1.05
LEFT ANTERIOR TIBIAL: 155 MMHG
LEFT ARM BP: 149 MMHG
LEFT POSTERIOR TIBIAL: 156 MMHG
LEFT TBI: 1.11
LEFT TOE PRESSURE: 166 MMHG
LYMPHOCYTES # BLD: 3.4 K/UL (ref 0.8–3.5)
LYMPHOCYTES NFR BLD: 29 % (ref 12–49)
MAGNESIUM SERPL-MCNC: 2.3 MG/DL (ref 1.6–2.4)
MCH RBC QN AUTO: 31.8 PG (ref 26–34)
MCHC RBC AUTO-ENTMCNC: 31.9 G/DL (ref 30–36.5)
MCV RBC AUTO: 99.8 FL (ref 80–99)
MONOCYTES # BLD: 1 K/UL (ref 0–1)
MONOCYTES NFR BLD: 8 % (ref 5–13)
NEUTS SEG # BLD: 7.2 K/UL (ref 1.8–8)
NEUTS SEG NFR BLD: 61 % (ref 32–75)
NRBC # BLD: 0 K/UL (ref 0–0.01)
NRBC BLD-RTO: 0 PER 100 WBC
PHOSPHATE SERPL-MCNC: 3.2 MG/DL (ref 2.6–4.7)
PLATELET # BLD AUTO: 295 K/UL (ref 150–400)
PMV BLD AUTO: 10.1 FL (ref 8.9–12.9)
POTASSIUM SERPL-SCNC: 4 MMOL/L (ref 3.5–5.1)
RBC # BLD AUTO: 4.09 M/UL (ref 3.8–5.2)
RIGHT ABI: 0.56
RIGHT ANTERIOR TIBIAL: 83 MMHG
RIGHT ARM BP: 148 MMHG
RIGHT POSTERIOR TIBIAL: 84 MMHG
RIGHT TBI: 0.59
RIGHT TOE PRESSURE: 88 MMHG
SODIUM SERPL-SCNC: 140 MMOL/L (ref 136–145)
WBC # BLD AUTO: 11.8 K/UL (ref 3.6–11)

## 2021-06-15 PROCEDURE — 80048 BASIC METABOLIC PNL TOTAL CA: CPT

## 2021-06-15 PROCEDURE — 36415 COLL VENOUS BLD VENIPUNCTURE: CPT

## 2021-06-15 PROCEDURE — 74011250637 HC RX REV CODE- 250/637: Performed by: NURSE PRACTITIONER

## 2021-06-15 PROCEDURE — 83735 ASSAY OF MAGNESIUM: CPT

## 2021-06-15 PROCEDURE — 74011250637 HC RX REV CODE- 250/637: Performed by: PSYCHIATRY & NEUROLOGY

## 2021-06-15 PROCEDURE — 84100 ASSAY OF PHOSPHORUS: CPT

## 2021-06-15 PROCEDURE — 85025 COMPLETE CBC W/AUTO DIFF WBC: CPT

## 2021-06-15 RX ORDER — ATORVASTATIN CALCIUM 40 MG/1
40 TABLET, FILM COATED ORAL
Qty: 30 TABLET | Refills: 0 | Status: SHIPPED | OUTPATIENT
Start: 2021-06-15

## 2021-06-15 RX ADMIN — APIXABAN 5 MG: 5 TABLET, FILM COATED ORAL at 08:51

## 2021-06-15 RX ADMIN — SERTRALINE 50 MG: 50 TABLET, FILM COATED ORAL at 08:51

## 2021-06-15 RX ADMIN — HYDROXYCHLOROQUINE SULFATE 200 MG: 200 TABLET ORAL at 08:51

## 2021-06-15 NOTE — PROGRESS NOTES
I have reviewed discharge instructions with the patient. The patient verbalized understanding.       Stroke Education documented in Patient Education: YES  Core Measures Documented in Connect Care:  Risk Factors: YES  Warning signs of stroke: YES  When to Activate 911: YES  Medication Education for Risk Factors: YES  Smoking cessation if applicable: YES  Written Education Given:  YES    Discharge NIH Completed: YES  Score: 0    BRAINS: YES    Follow Up Appointment Made: YES  Date/Time if applicable: Patient to make appointment

## 2021-06-15 NOTE — PROGRESS NOTES
Pharmacist Discharge Medication Reconciliation    Discharging Provider: Dr. Larry Betancourt PMH:   Past Medical History:   Diagnosis Date    Menopause     Osteopenia 11/6/2015    PAC (premature atrial contraction)     PAT (paroxysmal atrial tachycardia) (HCC)     Rheumatoid arthritis [facility])      Chief Complaint for this Admission: No chief complaint on file. Allergies: Levaquin [levofloxacin]    Discharge Medications:   Current Discharge Medication List        START taking these medications    Details   apixaban (ELIQUIS) 5 mg tablet Take 1 Tablet by mouth every twelve (12) hours. Qty: 60 Tablet, Refills: 0  Start date: 6/15/2021      atorvastatin (LIPITOR) 40 mg tablet Take 1 Tablet by mouth nightly. Qty: 30 Tablet, Refills: 0  Start date: 6/15/2021      Carine Bullock, Outpatient PT  Qty: 1 Each, Refills: 0  Start date: 6/15/2021      Carine Bullock, Outpatient OT with cognitive focus  Qty: 1 Each, Refills: 0  Start date: 6/15/2021      Carine Bullock, Outpatient PT  Qty: 1 Each, Refills: 0  Start date: 6/15/2021       !! - Potential duplicate medications found. Please discuss with provider. CONTINUE these medications which have NOT CHANGED    Details   metoprolol succinate (TOPROL-XL) 25 mg XL tablet Take 1 tablet by mouth once daily  Qty: 90 Tab, Refills: 1      ergocalciferol (VITAMIN D2) 50,000 unit capsule Take 50,000 Units by mouth every seven (7) days. sertraline (ZOLOFT) 50 mg tablet Take 1 Tab by mouth daily. Qty: 30 Tab, Refills: 5      hydroxychloroquine (PLAQUENIL) 200 mg tablet Take 1 Tab by mouth daily. Qty: 30 Tab, Refills: 6      diphenhydrAMINE (BENADRYL) 25 mg tablet Take 25 mg by mouth nightly. STOP taking these medications       diclofenac (VOLTAREN) 1 % gel Comments:   Reason for Stopping:         ibuprofen 200 mg cap Comments:   Reason for Stopping:               The patient's chart, MAR and AVS were reviewed by Lizandro Marquez.

## 2021-06-15 NOTE — PROGRESS NOTES
Problem: Falls - Risk of  Goal: *Absence of Falls  Description: Document Linn Ferreira Fall Risk and appropriate interventions in the flowsheet.   6/15/2021 0724 by Gualberto Jason RN  Outcome: Progressing Towards Goal  Note: Fall Risk Interventions:  Mobility Interventions: Assess mobility with egress test, OT consult for ADLs, Patient to call before getting OOB    Mentation Interventions: Adequate sleep, hydration, pain control, Door open when patient unattended, Familiar objects from home, Increase mobility    Medication Interventions: Evaluate medications/consider consulting pharmacy, Patient to call before getting OOB, Teach patient to arise slowly    Elimination Interventions: Call light in reach, Patient to call for help with toileting needs, Stay With Me (per policy)    History of Falls Interventions: Consult care management for discharge planning, Door open when patient unattended, Investigate reason for fall, Vital signs minimum Q4HRs X 24 hrs (comment for end date)      6/15/2021 0446 by Gualberto Jason RN  Outcome: Progressing Towards Goal  Note: Fall Risk Interventions:  Mobility Interventions: Assess mobility with egress test, OT consult for ADLs, Patient to call before getting OOB    Mentation Interventions: Adequate sleep, hydration, pain control, Door open when patient unattended, Familiar objects from home, Increase mobility    Medication Interventions: Evaluate medications/consider consulting pharmacy, Patient to call before getting OOB, Teach patient to arise slowly    Elimination Interventions: Call light in reach, Patient to call for help with toileting needs, Stay With Me (per policy)    History of Falls Interventions: Consult care management for discharge planning, Door open when patient unattended, Investigate reason for fall, Vital signs minimum Q4HRs X 24 hrs (comment for end date)      6/15/2021 0446 by Gualberto Jason RN  Outcome: Progressing Towards Goal  Note: Fall Risk Interventions:  Mobility Interventions: Assess mobility with egress test, OT consult for ADLs, Patient to call before getting OOB    Mentation Interventions: Adequate sleep, hydration, pain control, Door open when patient unattended, Familiar objects from home, Increase mobility    Medication Interventions: Evaluate medications/consider consulting pharmacy, Patient to call before getting OOB, Teach patient to arise slowly    Elimination Interventions: Call light in reach, Patient to call for help with toileting needs, Stay With Me (per policy)    History of Falls Interventions: Consult care management for discharge planning, Door open when patient unattended, Investigate reason for fall, Vital signs minimum Q4HRs X 24 hrs (comment for end date)         Problem: Patient Education: Go to Patient Education Activity  Goal: Patient/Family Education  6/15/2021 0724 by Ruth Silva RN  Outcome: Progressing Towards Goal  6/15/2021 0446 by Ruth Silva RN  Outcome: Progressing Towards Goal  6/15/2021 0446 by Ruth Silva RN  Outcome: Progressing Towards Goal     Problem: Patient Education: Go to Patient Education Activity  Goal: Patient/Family Education  6/15/2021 0724 by Ruth Silva RN  Outcome: Progressing Towards Goal  6/15/2021 0446 by Ruth Silva RN  Outcome: Progressing Towards Goal  6/15/2021 0446 by Ruth Silva RN  Outcome: Progressing Towards Goal     Problem: Patient Education: Go to Patient Education Activity  Goal: Patient/Family Education  6/15/2021 0724 by Ruth Silva RN  Outcome: Progressing Towards Goal  6/15/2021 0446 by Ruth Silva RN  Outcome: Progressing Towards Goal  6/15/2021 0446 by Ruth Silva RN  Outcome: Progressing Towards Goal     Problem: Patient Education: Go to Patient Education Activity  Goal: Patient/Family Education  6/15/2021 0724 by Ruth Silva RN  Outcome: Progressing Towards Goal  6/15/2021 [address] by Ruth Silva RN  Outcome: Progressing Towards Goal  6/15/2021 0446 by Samantha Staley RN  Outcome: Progressing Towards Goal     Problem: Patient Education: Go to Patient Education Activity  Goal: Patient/Family Education  6/15/2021 0724 by Samantha Staley RN  Outcome: Progressing Towards Goal  6/15/2021 0446 by Samantha Staley RN  Outcome: Progressing Towards Goal  6/15/2021 0446 by Samantha Staley RN  Outcome: Progressing Towards Goal     Problem: Patient Education: Go to Patient Education Activity  Goal: Patient/Family Education  6/15/2021 0724 by Samantha Staley RN  Outcome: Progressing Towards Goal  6/15/2021 0446 by Samantha Staley RN  Outcome: Progressing Towards Goal  6/15/2021 0446 by Samantha Staley RN  Outcome: Progressing Towards Goal     Problem: Patient Education: Go to Patient Education Activity  Goal: Patient/Family Education  6/15/2021 0724 by Samantha Staley RN  Outcome: Progressing Towards Goal  6/15/2021 0446 by Samantha Staley RN  Outcome: Progressing Towards Goal  6/15/2021 0446 by Samantha Staley RN  Outcome: Progressing Towards Goal     Problem: Patient Education: Go to Patient Education Activity  Goal: Patient/Family Education  Outcome: Progressing Towards Goal     Problem: TIA/CVA Stroke: Day 2 Until Discharge  Goal: Off Pathway (Use only if patient is Off Pathway)  Outcome: Progressing Towards Goal  Goal: Activity/Safety  Outcome: Progressing Towards Goal  Goal: Diagnostic Test/Procedures  Outcome: Progressing Towards Goal  Goal: Nutrition/Diet  Outcome: Progressing Towards Goal  Goal: Discharge Planning  Outcome: Progressing Towards Goal  Goal: Medications  Outcome: Progressing Towards Goal  Goal: Respiratory  Outcome: Progressing Towards Goal  Goal: Treatments/Interventions/Procedures  Outcome: Progressing Towards Goal  Goal: Psychosocial  Outcome: Progressing Towards Goal  Goal: *Verbalizes anxiety and depression are reduced or absent  Outcome: Progressing Towards Goal  Goal: *Absence of aspiration  Outcome: Progressing Towards Goal  Goal: *Absence of deep venous thrombosis signs and symptoms(Stroke Metric)  Outcome: Progressing Towards Goal  Goal: *Optimal pain control at patient's stated goal  Outcome: Progressing Towards Goal  Goal: *Tolerating diet  Outcome: Progressing Towards Goal  Goal: *Ability to perform ADLs and demonstrates progressive mobility and function  Outcome: Progressing Towards Goal  Goal: *Stroke education continued(Stroke Metric)  Outcome: Progressing Towards Goal

## 2021-06-15 NOTE — PROGRESS NOTES
Problem: Falls - Risk of  Goal: *Absence of Falls  Description: Document Mari Conner Fall Risk and appropriate interventions in the flowsheet.   Outcome: Progressing Towards Goal  Note: Fall Risk Interventions:  Mobility Interventions: Communicate number of staff needed for ambulation/transfer, Patient to call before getting OOB    Mentation Interventions: More frequent rounding    Medication Interventions: Patient to call before getting OOB    Elimination Interventions: Call light in reach    History of Falls Interventions: Consult care management for discharge planning         Problem: TIA/CVA Stroke: Day 2 Until Discharge  Goal: Activity/Safety  Outcome: Progressing Towards Goal  Goal: Diagnostic Test/Procedures  Outcome: Progressing Towards Goal  Goal: Nutrition/Diet  Outcome: Progressing Towards Goal  Goal: Discharge Planning  Outcome: Progressing Towards Goal  Goal: Medications  Outcome: Progressing Towards Goal  Goal: Respiratory  Outcome: Progressing Towards Goal  Goal: Psychosocial  Outcome: Progressing Towards Goal  Goal: *Verbalizes anxiety and depression are reduced or absent  Outcome: Progressing Towards Goal  Goal: *Ability to perform ADLs and demonstrates progressive mobility and function  Outcome: Progressing Towards Goal

## 2021-06-15 NOTE — CONSULTS
Vascular Surgery Consult  --full note dictated  --69 yo female doing well following catheter thrombectomy after cva  --has remote right calf claudication with an abnormal JAYLAN  --I will have her scheduled for an outpatient arteriogram after she has completey recovered  --thanks

## 2021-06-15 NOTE — DISCHARGE INSTRUCTIONS
Patient Education        Stroke: Care Instructions  Your Care Instructions     You have had a stroke. This means that the blood flow to a part of your brain was blocked for some time, which damages the nerve cells in that part of the brain. The part of your body controlled by that part of your brain may not function properly now. The brain is an amazing organ that can heal itself to some degree. The stroke you had damaged part of your brain. But other parts of your brain may take over in some way for the damaged areas. You have already started this process. Your doctor will talk with you about what you can do to prevent another stroke. High blood pressure, high cholesterol, and diabetes are all risk factors for stroke. If you have any of these conditions, work with your doctor to make sure they are under control. Other risk factors for stroke include being overweight, smoking, and not getting regular exercise. Going home may be hard for you and your family. The more you can try to do for yourself, the better. Remember to take each day one at a time. Follow-up care is a key part of your treatment and safety. Be sure to make and go to all appointments, and call your doctor if you are having problems. It's also a good idea to know your test results and keep a list of the medicines you take. How can you care for yourself at home?    · Enter a stroke rehabilitation (rehab) program, if your doctor recommends it. Physical, speech, and occupational therapies can help you manage bathing, dressing, eating, and other basics of daily living.     · Do not drive until your doctor says it is okay.     · It is normal to feel sad or depressed after a stroke. If these feelings last, talk to your doctor.     · If you are having problems with urine leakage, go to the bathroom at regular times, including when you first wake up and at bedtime. Also, limit fluids after dinner.     · If you are constipated, drink plenty of fluids.  If you have kidney, heart, or liver disease and have to limit fluids, talk with your doctor before you increase the amount of fluids you drink. Set up a regular time for using the toilet. If you continue to have constipation, your doctor may suggest using a bulking agent, such as Metamucil, or a stool softener, laxative, or enema. Medicines    · Take your medicines exactly as prescribed. Call your doctor if you think you are having a problem with your medicine. You may be taking several medicines. ACE (angiotensin-converting enzyme) inhibitors, angiotensin II receptor blockers (ARBs), beta-blockers, diuretics (water pills), and calcium channel blockers control your blood pressure. Statins help lower cholesterol. Your doctor may also prescribe medicines for depression, pain, sleep problems, anxiety, or agitation.     · If your doctor has given you a blood thinner to prevent another stroke, be sure you get instructions about how to take your medicine safely. Blood thinners can cause serious bleeding problems.     · Do not take any over-the-counter medicines or herbal products without talking to your doctor first.     · If you take birth control pills or hormone therapy, talk to your doctor about whether they are right for you. For family members and caregivers    · Make the home safe. Set up a room so that your loved one does not have to climb stairs. Be sure the bathroom is on the same floor. Move throw rugs and furniture that could cause falls. Make sure that the lighting is good. Put grab bars and seats in tubs and showers.     · Find out what your loved one can do and what they need help with. Try not to do things for your loved one that your loved one can do on their own. Help them learn and practice new skills.     · Visit and talk with your loved one often. Try doing activities together that you both enjoy, such as playing cards or board games. Keep in touch with your loved one's friends as much as you can. Encourage them to visit.     · Take care of yourself. Do not try to do everything yourself. Ask other family members to help. Eat well, get enough rest, and take time to do things that you enjoy. Keep up with your own doctor visits, and make sure to take your medicines regularly. Get out of the house as much as you can. Join a local support group. Find out if you qualify for home health care visits to help with rehab or for adult day care. When should you call for help? Call 911 anytime you think you may need emergency care. For example, call if:    · You have signs of another stroke. These may include:  ? Sudden numbness, tingling, weakness, or loss of movement in your face, arm, or leg, especially on only one side of your body. ? Sudden vision changes. ? Sudden trouble speaking. ? Sudden confusion or trouble understanding simple statements. ? Sudden problems with walking or balance. ? A sudden, severe headache that is different from past headaches. Call 911 even if these symptoms go away in a few minutes. Call your doctor now or seek immediate medical care if:    · You have new symptoms that may be related to your stroke, such as falls or trouble swallowing. Watch closely for changes in your health, and be sure to contact your doctor if you have any problems. Where can you learn more? Go to http://www.gray.com/  Enter C294 in the search box to learn more about \"Stroke: Care Instructions. \"  Current as of: March 4, 2020               Content Version: 12.8  © 2006-2021 [employer]. Care instructions adapted under license by [employer] (which disclaims liability or warranty for this information). If you have questions about a medical condition or this instruction, always ask your healthcare professional. [address] any warranty or liability for your use of this information.

## 2021-06-15 NOTE — PROGRESS NOTES
Neuro Critical Care Progress Note  Luisana Marsh NP  Neurocritical Care Nurse Practitioner      Admit Date: 2021   LOS: 3 days           Pulse Check    Objective:   Vital signs  Temp (24hrs), Av.1 °F (36.7 °C), Min:97.8 °F (36.6 °C), Max:98.3 °F (36.8 °C)   No intake/output data recorded.  0701 -  1900  In: 1549.2 [P.O.:760; I.V.:789.2]  Out: 901 [Urine:901]  Visit Vitals  /89 (BP 1 Location: Right upper arm, BP Patient Position: At rest;Lying;Supine)   Pulse (!) 54   Temp 98.2 °F (36.8 °C)   Resp 21   Ht 5' 8\" (1.727 m)   Wt 82.1 kg (181 lb)   SpO2 94%   BMI 27.52 kg/m²    O2 Flow Rate (L/min): 2 l/min O2 Device: None (Room air)   Vitals:    21 1751 21 2200 21 2228 06/15/21 0220   BP: (!) 158/87 (!) 157/72 134/61 131/89   Pulse: 68 71 (!) 48 (!) 54   Resp: 15 19 13 21   Temp: 98.3 °F (36.8 °C) 97.8 °F (36.6 °C)  98.2 °F (36.8 °C)   SpO2: 96% 98%  94%   Weight:       Height:            Physical Exam:  GENERAL: Calm, cooperative, NAD  SKIN: Warm, dry, color appropriate for ethnicity. Groin site C/D/I  Cardiovascular: Pulses distal to endovascular access site 2+. No abnormal edema distal to site. Neurologic Exam:  Mental Status:  Alert and oriented x 4. Appropriate affect, mood and behavior.        Language:    Normal fluency    Sensation:    Sensation intact throughout to light touch      **Pulse/Groin Check Note only    Luisana Marsh NP  Neurocritical Care Nurse Practitioner

## 2021-06-15 NOTE — PROGRESS NOTES
Hospital follow-up PCP transitional care appointment has been scheduled with Dr. Paulino Barry for Friday, 6/18/21 at 3:00 p.m. Pending patient discharge.   Trip Puentes, Care Management Specialist.

## 2021-06-15 NOTE — PROGRESS NOTES
Bedside shift change report given to [address] Delon Rodriguez (oncoming nurse) by Tyrel Nolen RN (offgoing nurse). Report included the following information SBAR, ED Summary, Procedure Summary, Intake/Output and Cardiac Rhythm nsr to sinus tati.

## 2021-06-15 NOTE — DISCHARGE SUMMARY
Discharge Summary       PATIENT ID: Michael Ramirez  MRN: [id number]   YOB: 1940    DATE OF ADMISSION: 6/12/2021  9:14 PM    DATE OF DISCHARGE: 06/15/2021   PRIMARY CARE PROVIDER: Trae TINSLEY MD     ATTENDING PHYSICIAN: Yaakov Randle  DISCHARGING PROVIDER: Nas Loredo MD    To contact this individual call [phone] and ask the  to page. If unavailable ask to be transferred the Adult Hospitalist Department. CONSULTATIONS: IP CONSULT TO NEUROINTERVENTIONAL SURGERY  IP CONSULT TO VASCULAR SURGERY    PROCEDURES/SURGERIES: * No surgery found *    ADMITTING DIAGNOSES & HOSPITAL COURSE:     HPI  Michael Ramirez is a 68 y.o. female with a history of rheumatoid arthritis, atrial fibrillation, ITP requiring splenectomy, and intermittent cigarette smoking. Patient presented to the [facility] ER via EMS due to sliding from her chair to the floor and not being able to get up due to right sided weakness. She stated that she fell from the chair at approximately 3:30 or 4:00 pm. She was in her usual state of health prior to this event. On arrival to the ER she was taken for [address] which showed no acute hemorrhage, mass or infarct. Teleneurology did not want to administer tPA as patient's NIHSS was only a 2. She was taken back for a CTA of the head and neck and a proximal M1 occlusion was noted. There was no definite large completed infarction noted. Dr. Simon Sparrow with Neurointerventional was consulted and patient was flown to [facility] for endovascular intervention. On arrival to [facility] ER the patient's symptoms had all but resolved but she was at risk for deterioration due to the left M1 occlusion. Dr. Simon Sparrow discussed the risks and benefits of the procedure with the patient and she stated that she wanted to have the thrombectomy. Consent was obtained. She was taken to angio where a 10 mm thromboembolus was successfully removed from the proximal left M1 segment.  Patient was transferred to the ICU for close monitoring. Hospital Course  Patient was initially admitted to ICU after s/p thrombectomy of the left M1 occlusion. Subsequent MRI shows multiple foci of acute ischemia in left basal ganglia and left periventricular deep white matter, left temporal subcortical white matter, right occipital cortex and left cerebellum. Given the location of multiple strokes, it was likely embolic source. Patient and family tells me that she was followed with a cardiologist and was recently diagnosed with atrial fibrillation. Patient started on Eliquis 5 mg twice daily this hospitalization. Patient to continue statin. Smoking cessation counseling was done. PT evaluated the patient and recommended outpatient PT versus home PT. Patient wanted to go to outpatient physical therapy. Prescription was given. Patient also was noted to have decreased right pedal pulses and subsequently arterial duplex was done showing moderate right lower extremity arterial obstruction. Vascular surgery evaluated the patient and recommended outpatient arteriogram when she is more recovered from her stroke. DISCHARGE DIAGNOSES / PLAN:      1. CVA  2. Atrial fibrillation  3. Peripheral vascular disease  4. Hypertension  5. Rheumatoid arthritis  6. Anxiety disorder  7. Tobacco use     ADDITIONAL CARE RECOMMENDATIONS:     Follow-up with PCP in 1 week, follow-up with cardiology as scheduled. Patient to follow-up with outpatient physical therapy.     PENDING TEST RESULTS:   At the time of discharge the following test results are still pending: None    FOLLOW UP APPOINTMENTS:    Follow-up Information     Follow up With Specialties Details Why Contact Info    Desire Guallpa MD Internal Medicine   [address] [facility]  [phone]               DIET: Cardiac Diet  Oral Nutritional Supplements: No Oral Supplement prescribed    ACTIVITY: PT/OT Eval and Treat    WOUND CARE: None    EQUIPMENT needed: None      DISCHARGE MEDICATIONS:  Current Discharge Medication List      START taking these medications    Details   apixaban (ELIQUIS) 5 mg tablet Take 1 Tablet by mouth every twelve (12) hours. Qty: 60 Tablet, Refills: 0  Start date: 6/15/2021      atorvastatin (LIPITOR) 40 mg tablet Take 1 Tablet by mouth nightly. Qty: 30 Tablet, Refills: 0  Start date: 6/15/2021      Kenneth Causey, Outpatient PT  Qty: 1 Each, Refills: 0  Start date: 6/15/2021         CONTINUE these medications which have NOT CHANGED    Details   metoprolol succinate (TOPROL-XL) 25 mg XL tablet Take 1 tablet by mouth once daily  Qty: 90 Tab, Refills: 1      ergocalciferol (VITAMIN D2) 50,000 unit capsule Take 50,000 Units by mouth every seven (7) days. sertraline (ZOLOFT) 50 mg tablet Take 1 Tab by mouth daily. Qty: 30 Tab, Refills: 5      hydroxychloroquine (PLAQUENIL) 200 mg tablet Take 1 Tab by mouth daily. Qty: 30 Tab, Refills: 6      diphenhydrAMINE (BENADRYL) 25 mg tablet Take 25 mg by mouth nightly. STOP taking these medications       diclofenac (VOLTAREN) 1 % gel Comments:   Reason for Stopping:         ibuprofen 200 mg cap Comments:   Reason for Stopping:                 NOTIFY YOUR PHYSICIAN FOR ANY OF THE FOLLOWING:   Fever over 101 degrees for 24 hours. Chest pain, shortness of breath, fever, chills, nausea, vomiting, diarrhea, change in mentation, falling, weakness, bleeding. Severe pain or pain not relieved by medications. Or, any other signs or symptoms that you may have questions about.     DISPOSITION:    Home With:   OT  PT  HH  RN       Long term SNF/Inpatient Rehab    Independent/assisted living    Hospice    Other:       PATIENT CONDITION AT DISCHARGE:     Functional status    Poor     Deconditioned     Independent      Cognition     Lucid     Forgetful     Dementia      Catheters/lines (plus indication)    Roe     PICC     PEG     None      Code status     Full code     DNR      PHYSICAL EXAMINATION AT DISCHARGE:  General:          Alert, cooperative, no distress, appears stated age. HEENT:           Atraumatic, anicteric sclerae, pink conjunctivae                          No oral ulcers, mucosa moist, throat clear, dentition fair  Neck:               Supple, symmetrical  Lungs:             Clear to auscultation bilaterally. No Wheezing or Rhonchi. No rales. Chest wall:      No tenderness  No Accessory muscle use. Heart:              Regular  rhythm,  No  murmur   No edema  Abdomen:        Soft, non-tender. Not distended. Bowel sounds normal  Extremities:     No cyanosis. No clubbing,                            Skin turgor normal, Capillary refill normal  Skin:                Not pale. Not Jaundiced  No rashes   Psych:             Not anxious or agitated.   Neurologic:      Alert, moves all extremities, answers questions appropriately and responds to commands       CHRONIC MEDICAL DIAGNOSES:  Problem List as of 6/15/2021 Date Reviewed: 5/25/2021        Codes Class Noted - Resolved    Right sided weakness ICD-10-CM: R53.1  ICD-9-CM: 728.87  6/12/2021 - Present        Ischemic stroke ([facility] ICD-10-CM: I63.9  ICD-9-CM: 434.91  6/12/2021 - Present        Stroke (cerebrum) ([facility] ICD-10-CM: I63.9  ICD-9-CM: 434.91  6/12/2021 - Present        Palpitations ICD-10-CM: R00.2  ICD-9-CM: 785.1  11/14/2017 - Present        PAT (paroxysmal atrial tachycardia) ([facility] ICD-10-CM: I47.1  ICD-9-CM: 427.0  11/14/2017 - Present        Rheumatoid arthritis with positive rheumatoid factor (HCC) ICD-10-CM: M05.9  ICD-9-CM: 714.0  2/24/2016 - Present        Osteopenia ICD-10-CM: M85.80  ICD-9-CM: 733.90  2/24/2016 - Present        MARLY (generalized anxiety disorder) ICD-10-CM: F41.1  ICD-9-CM: 300.02  2/24/2016 - Present        Smoker ICD-10-CM: F17.200  ICD-9-CM: 305.1  2/24/2016 - Present              Greater than 60 minutes were spent with the patient on counseling and coordination of care    Signed:   Neda Carrillo, MD  6/15/2021  11:59 AM

## 2021-06-15 NOTE — PROGRESS NOTES
Transition of Care Plan   RUR- Low  11%   DISPOSITION: Return home + outpatient PT   F/U with PCP/Specialist     Transport: Family    CM reviewed patient chart and discussed case in IDRs; patient likely medically stable to discharge today with outpatient PT. Dr. Jas Wallace will provide script for patient at discharge so she can attend PT near home. MD requested CM give patient 30-day Eloquis card; patient stated that she already had one at home, did not need another. Medicare pt has received, reviewed, and signed 2nd IM letter informing them of their right to appeal the discharge. Signed copy has been placed on pt bedside chart. Jimmy JacksonvilleMAITE Bain.

## 2021-06-15 NOTE — PROGRESS NOTES
Problem: Falls - Risk of  Goal: *Absence of Falls  Description: Document [place] Fall Risk and appropriate interventions in the flowsheet.   6/15/2021 0446 by Evon Romero RN  Outcome: Progressing Towards Goal  Note: Fall Risk Interventions:  Mobility Interventions: Assess mobility with egress test, OT consult for ADLs, Patient to call before getting OOB    Mentation Interventions: Adequate sleep, hydration, pain control, Door open when patient unattended, Familiar objects from home, Increase mobility    Medication Interventions: Evaluate medications/consider consulting pharmacy, Patient to call before getting OOB, Teach patient to arise slowly    Elimination Interventions: Call light in reach, Patient to call for help with toileting needs, Stay With Me (per policy)    History of Falls Interventions: Consult care management for discharge planning, Door open when patient unattended, Investigate reason for fall, Vital signs minimum Q4HRs X 24 hrs (comment for end date)      6/15/2021 0446 by Evon Romero RN  Outcome: Progressing Towards Goal  Note: Fall Risk Interventions:  Mobility Interventions: Assess mobility with egress test, OT consult for ADLs, Patient to call before getting OOB    Mentation Interventions: Adequate sleep, hydration, pain control, Door open when patient unattended, Familiar objects from home, Increase mobility    Medication Interventions: Evaluate medications/consider consulting pharmacy, Patient to call before getting OOB, Teach patient to arise slowly    Elimination Interventions: Call light in reach, Patient to call for help with toileting needs, Stay With Me (per policy)    History of Falls Interventions: Consult care management for discharge planning, Door open when patient unattended, Investigate reason for fall, Vital signs minimum Q4HRs X 24 hrs (comment for end date)         Problem: Patient Education: Go to Patient Education Activity  Goal: Patient/Family Education  6/15/2021 0446 by Arabella Mcdowell RN  Outcome: Progressing Towards Goal  6/15/2021 0446 by Arabella Mcdowell RN  Outcome: Progressing Towards Goal     Problem: Patient Education: Go to Patient Education Activity  Goal: Patient/Family Education  6/15/2021 0446 by Arabella Mcdowell RN  Outcome: Progressing Towards Goal  6/15/2021 0446 by Arabella Mcdowell RN  Outcome: Progressing Towards Goal     Problem: Patient Education: Go to Patient Education Activity  Goal: Patient/Family Education  6/15/2021 0446 by Arabella Mcdowell RN  Outcome: Progressing Towards Goal  6/15/2021 0446 by Arabella Mcdowell RN  Outcome: Progressing Towards Goal

## 2021-06-15 NOTE — CONSULTS
[address]    Name:  Lilian Hough  MR#:  [id number]  :  1940  ACCOUNT #:  [de-identified]  DATE OF SERVICE:  06/15/2021      REASON FOR CONSULTATION:  Claudication. HISTORY OF PRESENT ILLNESS:  The patient is a 77-year-old female who has undergone a urgent catheter-directed thrombectomy related to a stroke. She is doing very well from this and she has minimal if any deficit at this time. She has been placed on anticoagulation. We are consulted for what sounds like chronic right calf claudication for over a month, at least.  She has been a long-time smoker and with multiple other medical issues. Currently, she has no complaints. PAST MEDICAL HISTORY:  Significant for:  1. Atrial fibrillation. 2.  Rheumatoid arthritis. 3.  ITP. CURRENT MEDICATIONS:  Include,  1. Eliquis  2. Lipitor. 3.  Plaquenil. 4.  Toprol-XL. 5.  Zoloft. ALLERGIES:  SHE IS ALLERGIC TO LEVAQUIN. SOCIAL HISTORY:  As above. PHYSICAL EXAMINATION:  GENERAL:  Alert and oriented, in no acute distress. VITAL SIGNS:  Temperature is 97.8 heart rate 70, blood pressure is 122/62, she is 96% on room air. LUNGS:  Clear to auscultation bilaterally. HEART:  Irregular. ABDOMEN:  Soft, nontender. EXTREMITIES:  She has palpable pulses in the left leg throughout. The right leg features palpable popliteal and femoral pulses. The dorsalis pedis pulse is palpable but diminished. LABORATORY DATA:  Her lab values have been reviewed. She has had an ankle-brachial index that shows an JAYLAN of 0.56 on the right and normal on the left. IMPRESSION AND PLAN:  A 77-year-old female with exam and studies consistent with some right leg arterial occlusive disease. I think this can be dealt with as an outpatient after she has completely recovered. I have discussed this with her and her family, and we will plan on scheduling her for about a month out after her discharge from this issue.     Thank you for the consultation.       Da Nava MD [id number]  D:  06/15/2021 10:46  T:  06/15/2021 12:22  JOB #:  [id number]

## 2021-08-03 PROBLEM — I63.9 STROKE (CEREBRUM) (HCC): Status: RESOLVED | Noted: 2021-06-12 | Resolved: 2021-08-03

## 2021-09-14 ENCOUNTER — HOSPITAL ENCOUNTER (OUTPATIENT)
Dept: GENERAL RADIOLOGY | Age: 81
Discharge: HOME | End: 2021-09-14
Payer: MEDICARE

## 2021-09-14 ENCOUNTER — TRANSCRIBE ORDER (OUTPATIENT)
Dept: REGISTRATION | Age: 81
End: 2021-09-14

## 2021-09-14 DIAGNOSIS — R10.11 RIGHT UPPER QUADRANT PAIN: Primary | ICD-10-CM

## 2021-09-14 DIAGNOSIS — R10.11 RIGHT UPPER QUADRANT PAIN: ICD-10-CM

## 2021-09-14 DIAGNOSIS — M25.551 RIGHT HIP PAIN: ICD-10-CM

## 2021-09-14 PROCEDURE — 73502 X-RAY EXAM HIP UNI 2-3 VIEWS: CPT

## 2021-09-14 PROCEDURE — 74022 RADEX COMPL AQT ABD SERIES: CPT

## 2021-10-11 ENCOUNTER — HOSPITAL ENCOUNTER (OUTPATIENT)
Dept: ULTRASOUND IMAGING | Age: 81
Discharge: HOME | End: 2021-10-11
Attending: NURSE PRACTITIONER
Payer: MEDICARE

## 2021-10-11 DIAGNOSIS — M79.604 PAIN OF RIGHT LEG: ICD-10-CM

## 2021-10-11 PROCEDURE — 93971 EXTREMITY STUDY: CPT

## 2022-01-13 ENCOUNTER — APPOINTMENT (OUTPATIENT)
Dept: GENERAL RADIOLOGY | Age: 82
End: 2022-01-13
Attending: EMERGENCY MEDICINE
Payer: MEDICARE

## 2022-01-13 ENCOUNTER — HOSPITAL ENCOUNTER (EMERGENCY)
Age: 82
Discharge: HOME | End: 2022-01-13
Attending: EMERGENCY MEDICINE
Payer: MEDICARE

## 2022-01-13 VITALS
DIASTOLIC BLOOD PRESSURE: 84 MMHG | SYSTOLIC BLOOD PRESSURE: 139 MMHG | OXYGEN SATURATION: 97 % | BODY MASS INDEX: 27 KG/M2 | WEIGHT: 172 LBS | HEART RATE: 84 BPM | HEIGHT: 67 IN | TEMPERATURE: 98.4 F | RESPIRATION RATE: 18 BRPM

## 2022-01-13 DIAGNOSIS — S63.502A WRIST SPRAIN, LEFT, INITIAL ENCOUNTER: Primary | ICD-10-CM

## 2022-01-13 PROCEDURE — 73110 X-RAY EXAM OF WRIST: CPT

## 2022-01-13 PROCEDURE — 73090 X-RAY EXAM OF FOREARM: CPT

## 2022-01-13 PROCEDURE — 99283 EMERGENCY DEPT VISIT LOW MDM: CPT

## 2022-01-13 PROCEDURE — 73130 X-RAY EXAM OF HAND: CPT

## 2022-01-13 NOTE — ED NOTES
Pt noted to be out under the ambulance bay smoking a cigarette with her injured hand, notified patient she could not leave out of the ED, pt verbalized understanding and returned to ED

## 2022-01-13 NOTE — ED TRIAGE NOTES
Pt. Stated that last night she bent over to  a blanket, fell over and she fell on her left wrist. Pt. Now has pain in her left wrist 8/10. Pt. Put aspercreme on hand last night and this morning, also took 2 tablets of extra strength tylenol and has not relieved the pain.

## 2022-01-13 NOTE — ED PROVIDER NOTES
EMERGENCY DEPARTMENT HISTORY AND PHYSICAL EXAM      Date: 1/13/2022  Patient Name: Stanley Lanier    History of Presenting Illness     Chief Complaint   Patient presents with    Hand Injury     left       History Provided By: Patient    HPI: Stanley Lanier, 80 y.o. female with PMHx significant for PAT, rheumatoid arthritis, prior CVA, presents ambulatory to the ED with cc of left wrist pain. Patient reports she fell while bending over to feed her dog. [place] onto her outstretched left hand. Complains of pain in her distal radius area and the base of her thumb on the left. No other injuries. No open wounds. There is mild swelling. She is on Eliquis secondary to a prior CVA. No deficits. She reports mild swelling in the area of pain with movement. She took 2 extra strength Tylenol this morning prior to arrival.  He is right-hand dominant. PMHx: Significant for PAT, RA, prior CVA  PSHx: Significant for appendectomy, hysterectomy, splenectomy(secondary to ITP)  Social Hx: Pack-a-day smoker for 30 years. There are no other complaints, changes, or physical findings at this time. PCP: Lavern TINSLEY MD    No current facility-administered medications on file prior to encounter. Current Outpatient Medications on File Prior to Encounter   Medication Sig Dispense Refill    apixaban (ELIQUIS) 5 mg tablet Take 1 Tablet by mouth every twelve (12) hours. 60 Tablet 0    atorvastatin (LIPITOR) 40 mg tablet Take 1 Tablet by mouth nightly. 30 Tablet 0    OTHER,NON-FORMULARY, Outpatient PT 1 Each 0    OTHER,NON-FORMULARY, Outpatient OT with cognitive focus 1 Each 0    OTHER,NON-FORMULARY, Outpatient PT 1 Each 0    metoprolol succinate (TOPROL-XL) 25 mg XL tablet Take 1 tablet by mouth once daily 90 Tab 1    ergocalciferol (VITAMIN D2) 50,000 unit capsule Take 50,000 Units by mouth every seven (7) days.  sertraline (ZOLOFT) 50 mg tablet Take 1 Tab by mouth daily.  30 Tab 5    hydroxychloroquine (PLAQUENIL) 200 mg tablet Take 1 Tab by mouth daily. 30 Tab 6    diphenhydrAMINE (BENADRYL) 25 mg tablet Take 25 mg by mouth nightly. (Patient not taking: Reported on 2021)         Past History     Past Medical History:  Past Medical History:   Diagnosis Date    Menopause     Osteopenia 2015    PAC (premature atrial contraction)     PAT (paroxysmal atrial tachycardia) (HCC)     Rheumatoid arthritis (HCC)        Past Surgical History:  Past Surgical History:   Procedure Laterality Date    HX APPENDECTOMY      spleen    HX HYSTERECTOMY      HX OOPHORECTOMY      HX SHOULDER ARTHROSCOPY      HX SPLENECTOMY      IR PERC ART MECH THROMB INFUSION INTRACRANIAL  2021       Family History:  Family History   Problem Relation Age of Onset    Breast Cancer Mother     Breast Cancer Sister     Diabetes Father     Heart Disease Father        Social History:  Social History     Tobacco Use    Smoking status: Current Every Day Smoker     Packs/day: 1.00     Years: 30.00     Pack years: 30.00     Types: Cigarettes     Last attempt to quit: 2016     Years since quittin.7    Smokeless tobacco: Never Used   Substance Use Topics    Alcohol use: Yes     Alcohol/week: 7.0 standard drinks     Types: 7 Standard drinks or equivalent per week    Drug use: No       Allergies: Allergies   Allergen Reactions    Levaquin [Levofloxacin] Nausea Only         Review of Systems   Review of Systems   Constitutional: Negative for activity change, chills and fever. HENT: Negative for congestion and sore throat. Eyes: Negative for pain and redness. Respiratory: Negative for cough, chest tightness and shortness of breath. Cardiovascular: Negative for chest pain and palpitations. Gastrointestinal: Negative for abdominal pain, diarrhea, nausea and vomiting. Genitourinary: Negative for dysuria, frequency and urgency. Musculoskeletal: Positive for joint swelling. Negative for back pain and neck pain. Skin: Negative for rash. Neurological: Negative for syncope, light-headedness and headaches. Psychiatric/Behavioral: Negative for confusion. All other systems reviewed and are negative. Physical Exam   Physical Exam  Constitutional:       General: She is not in acute distress. Appearance: She is well-developed. She is not diaphoretic. HENT:      Head: Normocephalic and atraumatic. Eyes:      General: No scleral icterus. Conjunctiva/sclera: Conjunctivae normal.      Pupils: Pupils are equal, round, and reactive to light. Pulmonary:      Effort: Pulmonary effort is normal.   Musculoskeletal:      Comments: Left upper extremity: Moderate tenderness to distal radius. No distal ulna tenderness. Proximal or mid forearm tenderness. No elbow tenderness. Mild tenderness at the base of the first metacarpal area and over the thenar eminence. Skin intact. Mild soft tissue swelling over the distal radius area. 2+ radial and ulnar pulses. Brisk CR. Able to make a fist although painful. Flexes and extends all digits. Oppose his thumb and index finger. Flexes and extends wrist although painful. Remainder of hand nontender. Skin:     General: Skin is warm and dry. Findings: No rash. Neurological:      Mental Status: She is alert and oriented to person, place, and time. Psychiatric:         Behavior: Behavior normal.             Diagnostic Study Results     Labs -   No results found for this or any previous visit (from the past 12 hour(s)). Radiologic Studies -   XR HAND LT MIN 3 V   Final Result   No acute fracture. XR WRIST LT AP/LAT/OBL MIN 3V   Final Result   No acute fracture. XR FOREARM LT AP/LAT   Final Result   No acute fracture. CT Results  (Last 48 hours)    None        CXR Results  (Last 48 hours)    None            Medical Decision Making   I am the first provider for this patient.     I reviewed the vital signs, available nursing notes, past medical history, past surgical history, family history and social history. Vital Signs-Reviewed the patient's vital signs. Patient Vitals for the past 12 hrs:   Temp Pulse Resp BP SpO2   01/13/22 1120 98.4 °F (36.9 °C) 84 18 139/84 97 %           Records Reviewed: Nursing notes reviewed    Provider Notes (Medical Decision Making):   DDX: Wrist sprain, wrist fracture, dislocation. ED Course:   Initial assessment performed. The patients presenting problems have been discussed, and they are in agreement with the care plan formulated and outlined with them. I have encouraged them to ask questions as they arise throughout their visit. PROGRESS NOTE    Pt reevaluated. Plain films of forearm, wrist and hand all negative for acute fracture or dislocation. Reassured patient. Placed patient in wrist immobilizer. Advised her to follow-up with PCP or orthopedics in 2 weeks if no improvement in pain,  For repeat x-rays. Counseled patient on the possibility of subtle fracture is not visible today. Written by Kristi Bynum MD     Progress note:    Pt noted to be feeling better and ready for discharge. Updated pt and/or family on all final lab and/or  imaging findings. Will follow up as instructed. All questions have been answered, pt voiced understanding and agreement with plan. Specific return precautions provided as well as instructions to return to the ED should sx worsen at any time. Vital signs stable for discharge. I have also put together some discharge instructions for them that include: 1) educational information regarding their diagnosis, 2) how to care for their diagnosis at home, as well a 3) list of reasons why they would want to return to the ED prior to their follow-up appointment, should their condition change. Written by Kristi Bynum MD        Critical Care Time:   0    Disposition:  Discharge    PLAN:  1. Current Discharge Medication List        2.    Follow-up Information     Follow up With Specialties Details Why Contact Info    Desire Guallpa MD Internal Medicine Schedule an appointment as soon as possible for a visit in 1 week  [address] [facility]      Karen Austin MD Orthopedic Surgery Schedule an appointment as soon as possible for a visit  As needed [address] [facility]  [phone]          Return to ED if worse     Diagnosis     Clinical Impression:   1. Wrist sprain, left, initial encounter              Please note that this dictation was completed with [employer], the computer voice recognition software. Quite often unanticipated grammatical, syntax, homophones, and other interpretive errors are inadvertently transcribed by the computer software. Please disregard these errors. Please excuse any errors that have escaped final proofreading.

## 2022-01-13 NOTE — ED TRIAGE NOTES
Pt tried to catch herself when sliding onto the floor after fixing her dogs blanket, with her left arm yesterday.  Now c/o left hand/wrist pain

## 2022-03-09 NOTE — BRIEF OP NOTE
NEUROINTERVENTIONAL SURGERY POST-PROCEDURE NOTE        PROCEDURE:  Awake left MCA M1 thrombectomy  US guided arterial access:  RCFA  Angioseal closure: right CFA    VESSEL(S) STUDIED:  1. LCCA  2. LICA VESSEL(S) TREATED:  1. Left MCA M1      CSC METRICS:    Arrival to ED:  21:13, NIH, straight to angio       1. GROIN PUNCTURE TIME: 21:35     2. FIRST PASS TIME: 21:49     3. FINAL RECANALIZATION TIME:  21:54     4. PRE-TREATMENT TICI SCORE: 0     5. POST-TREATMENT TICI SCORE: 3    PRELIMINARY REPORT & DISPOSITION:     10 mm thromboembolus occluding the proximal left MCA M1 segment removed with one pass utilizing Cerenovus large bore and 3MAX. COMPLICATIONS:  None immediate    FOLLOW-UP:  -140  CT in AM DATE OF SERVICE:  6/12/2021 10:24 PM     ATTENDING SURGEON(S):  Silver Garcia MD      ANESTHESIA:   MAC    EBL: 5mL    MEDICATIONS:   See nursing record  Lidocaine local  2000U heparin IV  56 cc Isovue 300 contrast      PUNCTURE SITE:  Right common femoral artery. Arteriotomy closed with 8F Angioseal.  Flat with leg straight x 4 hours.
1 pair

## 2022-03-19 PROBLEM — I47.19 PAT (PAROXYSMAL ATRIAL TACHYCARDIA) (HCC): Status: ACTIVE | Noted: 2017-11-14

## 2022-03-19 PROBLEM — R00.2 PALPITATIONS: Status: ACTIVE | Noted: 2017-11-14

## 2022-03-19 PROBLEM — I63.9 ISCHEMIC STROKE (HCC): Status: ACTIVE | Noted: 2021-06-12

## 2022-03-19 PROBLEM — R53.1 RIGHT SIDED WEAKNESS: Status: ACTIVE | Noted: 2021-06-12

## 2022-03-19 PROBLEM — I47.1 PAT (PAROXYSMAL ATRIAL TACHYCARDIA) (HCC): Status: ACTIVE | Noted: 2017-11-14

## 2022-03-25 ENCOUNTER — TRANSCRIBE ORDER (OUTPATIENT)
Dept: SCHEDULING | Age: 82
End: 2022-03-25

## 2023-05-17 RX ORDER — ATORVASTATIN CALCIUM 40 MG/1
1 TABLET, FILM COATED ORAL NIGHTLY
COMMUNITY
Start: 2021-06-15

## 2023-05-17 RX ORDER — ERGOCALCIFEROL 1.25 MG/1
50000 CAPSULE ORAL
COMMUNITY

## 2023-05-17 RX ORDER — METOPROLOL SUCCINATE 25 MG/1
1 TABLET, EXTENDED RELEASE ORAL DAILY
COMMUNITY
Start: 2020-12-15

## 2023-05-17 RX ORDER — HYDROXYCHLOROQUINE SULFATE 200 MG/1
200 TABLET, FILM COATED ORAL DAILY
COMMUNITY
Start: 2016-06-22

## 2024-06-10 ENCOUNTER — TRANSCRIBE ORDERS (OUTPATIENT)
Facility: HOSPITAL | Age: 84
End: 2024-06-10

## 2024-06-10 DIAGNOSIS — R51.9 NONINTRACTABLE HEADACHE, UNSPECIFIED CHRONICITY PATTERN, UNSPECIFIED HEADACHE TYPE: Primary | ICD-10-CM

## 2024-07-28 ENCOUNTER — HOSPITAL ENCOUNTER (EMERGENCY)
Facility: HOSPITAL | Age: 84
Discharge: HOME | End: 2024-07-28
Attending: EMERGENCY MEDICINE
Payer: MEDICARE

## 2024-07-28 VITALS
BODY MASS INDEX: 26.94 KG/M2 | DIASTOLIC BLOOD PRESSURE: 115 MMHG | TEMPERATURE: 99.1 F | HEART RATE: 84 BPM | OXYGEN SATURATION: 96 % | RESPIRATION RATE: 18 BRPM | SYSTOLIC BLOOD PRESSURE: 142 MMHG | HEIGHT: 67 IN

## 2024-07-28 DIAGNOSIS — M79.605 LEFT LEG PAIN: Primary | ICD-10-CM

## 2024-07-28 PROCEDURE — 99282 EMERGENCY DEPT VISIT SF MDM: CPT

## 2024-07-28 ASSESSMENT — LIFESTYLE VARIABLES
HOW OFTEN DO YOU HAVE A DRINK CONTAINING ALCOHOL: NEVER
HOW MANY STANDARD DRINKS CONTAINING ALCOHOL DO YOU HAVE ON A TYPICAL DAY: PATIENT DOES NOT DRINK

## 2024-07-28 ASSESSMENT — PAIN - FUNCTIONAL ASSESSMENT: PAIN_FUNCTIONAL_ASSESSMENT: 0-10

## 2024-07-28 ASSESSMENT — PAIN SCALES - GENERAL: PAINLEVEL_OUTOF10: 7

## 2024-07-28 NOTE — ED TRIAGE NOTES
Pt reports that she was doing yard work yesterday and since that time has developed itchy bites around her left ankle. Also has some swelling with pain. Ambulatory without issue.

## 2024-07-28 NOTE — ED PROVIDER NOTES
[facility] EMERGENCY DEP  EMERGENCY DEPARTMENT ENCOUNTER       Pt Name: Bryanna Billings  MRN: [id number]  Birthdate 1940  Date of evaluation: 7/28/2024  Provider: RAJ Malave-S2 with Alfredo Shah MD   PCP: Kalyani Ho MD  Note Started: 4:28 PM EDT 7/28/24     CHIEF COMPLAINT       Chief Complaint   Patient presents with    Insect Bite        HISTORY OF PRESENT ILLNESS: 1 or more elements      History From: Patient  HPI Limitations: None     Bryanna Billings is a 83 y.o. female with history of stroke, rheumatoid arthritis, who presents POV to the ED for complaints of itchiness on her Left ankle and weakness in her left leg with associated knee pain. She said that yesterday morning she was pruning her hydrangeas when she noticed that her left ankle was itching. She thinks she got bit by an insect but never witnessed a tick, mosquito, or other bug. She thinks she has swelling in her ankle, but is unsure if it looks different from her Right. She has been applying Hydrogen Peroxide and Rubbing Alcohol to the area. She said that she has not had any difficulty with walking. Says that she was advised not to take Ibuprofen by her doctors so she has only taken Tylenol with little relief.     Denies any acute onset lightheadedness, dizziness, vision changes, speech changes. Denies chest pain, shortness of breath, abdominal pain. Denies numbness or tingling. Denies reduced ROM or sensation.     Nursing Notes were all reviewed and agreed with or any disagreements were addressed in the HPI.     REVIEW OF SYSTEMS      Review of Systems   All other systems reviewed and are negative.       Positives and Pertinent negatives as per HPI.    PAST HISTORY     Past Medical History:  Past Medical History:   Diagnosis Date    Menopause     Osteopenia 11/6/2015    PAC (premature atrial contraction)     PAT (paroxysmal atrial tachycardia) (HCC)     Rheumatoid arthritis (HCC)        Past Surgical History:  Past Surgical History:

## 2025-02-12 ENCOUNTER — HOSPITAL ENCOUNTER (EMERGENCY)
Facility: HOSPITAL | Age: 85
Discharge: HOME | End: 2025-02-12
Attending: EMERGENCY MEDICINE
Payer: MEDICARE

## 2025-02-12 ENCOUNTER — APPOINTMENT (OUTPATIENT)
Facility: HOSPITAL | Age: 85
End: 2025-02-12
Payer: MEDICARE

## 2025-02-12 VITALS
OXYGEN SATURATION: 98 % | RESPIRATION RATE: 15 BRPM | TEMPERATURE: 98.8 F | WEIGHT: 134 LBS | DIASTOLIC BLOOD PRESSURE: 74 MMHG | BODY MASS INDEX: 20.99 KG/M2 | SYSTOLIC BLOOD PRESSURE: 128 MMHG | HEART RATE: 78 BPM

## 2025-02-12 DIAGNOSIS — E86.0 DEHYDRATION: Primary | ICD-10-CM

## 2025-02-12 LAB
ALBUMIN SERPL-MCNC: 3.3 G/DL (ref 3.5–5)
ALBUMIN/GLOB SERPL: 1 (ref 1.1–2.2)
ALP SERPL-CCNC: 131 U/L (ref 45–117)
ALT SERPL-CCNC: 21 U/L (ref 12–78)
ANION GAP SERPL CALC-SCNC: 8 MMOL/L (ref 2–12)
AST SERPL-CCNC: 18 U/L (ref 15–37)
BASOPHILS # BLD: 0.07 K/UL (ref 0–0.1)
BASOPHILS NFR BLD: 0.6 % (ref 0–1)
BILIRUB SERPL-MCNC: 0.3 MG/DL (ref 0.2–1)
BUN SERPL-MCNC: 12 MG/DL (ref 6–20)
BUN/CREAT SERPL: 17 (ref 12–20)
CALCIUM SERPL-MCNC: 9.5 MG/DL (ref 8.5–10.1)
CHLORIDE SERPL-SCNC: 103 MMOL/L (ref 97–108)
CO2 SERPL-SCNC: 30 MMOL/L (ref 21–32)
CREAT SERPL-MCNC: 0.72 MG/DL (ref 0.55–1.02)
DIFFERENTIAL METHOD BLD: ABNORMAL
EKG ATRIAL RATE: 77 BPM
EKG DIAGNOSIS: NORMAL
EKG P AXIS: 107 DEGREES
EKG P-R INTERVAL: 158 MS
EKG Q-T INTERVAL: 352 MS
EKG QRS DURATION: 82 MS
EKG QTC CALCULATION (BAZETT): 398 MS
EKG R AXIS: 78 DEGREES
EKG T AXIS: 60 DEGREES
EKG VENTRICULAR RATE: 77 BPM
EOSINOPHIL # BLD: 0.12 K/UL (ref 0–0.4)
EOSINOPHIL NFR BLD: 1 % (ref 0–0.7)
ERYTHROCYTE [DISTWIDTH] IN BLOOD BY AUTOMATED COUNT: 12.2 % (ref 11.5–14.5)
ETHANOL SERPL-MCNC: <10 MG/DL (ref 0–0.08)
FLUAV RNA SPEC QL NAA+PROBE: NOT DETECTED
FLUBV RNA SPEC QL NAA+PROBE: NOT DETECTED
GLOBULIN SER CALC-MCNC: 3.4 G/DL (ref 2–4)
GLUCOSE SERPL-MCNC: 108 MG/DL (ref 65–100)
HCT VFR BLD AUTO: 44.4 % (ref 35–47)
HGB BLD-MCNC: 14.5 G/DL (ref 11.5–16)
IMM GRANULOCYTES # BLD AUTO: 0.06 K/UL (ref 0–0.04)
IMM GRANULOCYTES NFR BLD AUTO: 0.5 % (ref 0–0.5)
LYMPHOCYTES # BLD: 3.2 K/UL (ref 0.8–3.5)
LYMPHOCYTES NFR BLD: 26.4 % (ref 12–49)
MAGNESIUM SERPL-MCNC: 2.1 MG/DL (ref 1.6–2.4)
MCH RBC QN AUTO: 32 PG (ref 26–34)
MCHC RBC AUTO-ENTMCNC: 32.7 G/DL (ref 30–36.5)
MCV RBC AUTO: 98 FL (ref 80–99)
MONOCYTES # BLD: 0.94 K/UL (ref 0–1)
MONOCYTES NFR BLD: 7.8 % (ref 5–13)
NEUTS SEG # BLD: 7.73 K/UL (ref 1.8–8)
NEUTS SEG NFR BLD: 63.7 % (ref 32–75)
NRBC # BLD: 0 K/UL (ref 0–0.01)
NRBC BLD-RTO: 0 PER 100 WBC
PLATELET # BLD AUTO: 400 K/UL (ref 150–400)
PMV BLD AUTO: 9.5 FL (ref 8.9–12.9)
POTASSIUM SERPL-SCNC: 4.3 MMOL/L (ref 3.5–5.1)
PROT SERPL-MCNC: 6.7 G/DL (ref 6.4–8.2)
RBC # BLD AUTO: 4.53 M/UL (ref 3.8–5.2)
SARS-COV-2 RNA RESP QL NAA+PROBE: NOT DETECTED
SODIUM SERPL-SCNC: 141 MMOL/L (ref 136–145)
SOURCE: NORMAL
TROPONIN I SERPL HS-MCNC: 5 NG/L (ref 0–51)
WBC # BLD AUTO: 12.1 K/UL (ref 3.6–11)

## 2025-02-12 PROCEDURE — 93005 ELECTROCARDIOGRAM TRACING: CPT | Performed by: EMERGENCY MEDICINE

## 2025-02-12 PROCEDURE — 99285 EMERGENCY DEPT VISIT HI MDM: CPT

## 2025-02-12 PROCEDURE — 82077 ASSAY SPEC XCP UR&BREATH IA: CPT

## 2025-02-12 PROCEDURE — 2580000003 HC RX 258: Performed by: EMERGENCY MEDICINE

## 2025-02-12 PROCEDURE — 96361 HYDRATE IV INFUSION ADD-ON: CPT

## 2025-02-12 PROCEDURE — 96360 HYDRATION IV INFUSION INIT: CPT

## 2025-02-12 PROCEDURE — 85025 COMPLETE CBC W/AUTO DIFF WBC: CPT

## 2025-02-12 PROCEDURE — 83735 ASSAY OF MAGNESIUM: CPT

## 2025-02-12 PROCEDURE — 87636 SARSCOV2 & INF A&B AMP PRB: CPT

## 2025-02-12 PROCEDURE — 36415 COLL VENOUS BLD VENIPUNCTURE: CPT

## 2025-02-12 PROCEDURE — 80053 COMPREHEN METABOLIC PANEL: CPT

## 2025-02-12 PROCEDURE — 84484 ASSAY OF TROPONIN QUANT: CPT

## 2025-02-12 PROCEDURE — 71045 X-RAY EXAM CHEST 1 VIEW: CPT

## 2025-02-12 RX ORDER — 0.9 % SODIUM CHLORIDE 0.9 %
1000 INTRAVENOUS SOLUTION INTRAVENOUS ONCE
Status: COMPLETED | OUTPATIENT
Start: 2025-02-12 | End: 2025-02-12

## 2025-02-12 RX ADMIN — SODIUM CHLORIDE 1000 ML: 9 INJECTION, SOLUTION INTRAVENOUS at 18:14

## 2025-02-12 ASSESSMENT — PAIN SCALES - GENERAL: PAINLEVEL_OUTOF10: 0

## 2025-02-12 ASSESSMENT — PAIN - FUNCTIONAL ASSESSMENT
PAIN_FUNCTIONAL_ASSESSMENT: 0-10
PAIN_FUNCTIONAL_ASSESSMENT: NONE - DENIES PAIN

## 2025-02-12 NOTE — ED NOTES
Pt reports she will provide a urine specimen after she is able to go outside to smoke a cigarette. ED Provider aware.

## 2025-02-12 NOTE — ED TRIAGE NOTES
Pt brought by EMS for feeling dizzy today, low and high bps. Pt takes eliquis for chronic afib but does not like to take as prescribed and only takes once a day. Pt reports she is very weak and tired today, was unable to clean the kitchen last night.

## 2025-02-12 NOTE — ED NOTES
Pt demanding to go smoke cigarette so she can give a urine specimen. Pt educated that we cannot allow her to smoke.

## 2025-02-12 NOTE — ED PROVIDER NOTES
possibility of viral syndrome causing generalized weakness.  Disposition pending laboratory workup and imaging.    Amount and/or Complexity of Data Reviewed  Labs: ordered.  Radiology: ordered.  ECG/medicine tests: ordered and independent interpretation performed.     Details: EKG shows a sinus arrhythmia, rate 77.  No evidence of acute ischemic ST or T wave changes.  Interpreted by me    Risk  Prescription drug management.            ED Course as of 02/13/25 1038 Wed Feb 12, 2025 1915 Pt signed out to me by Dr. Marie. Has received a liter of saline for her fatigue. Will check urine. [VG]   2015 Pt ready to go home. When we asked for a urine she could not get the lid off the cup so she urinated in the toilet. Will discharge. [VG]      ED Course User Index  [VG] Sneha Colunga MD             FINAL IMPRESSION     1. Dehydration          DISPOSITION/PLAN   Bryanna Billings's  results have been reviewed with her.  She has been counseled regarding her diagnosis, treatment, and plan.  She verbally conveys understanding and agreement of the signs, symptoms, diagnosis, treatment and prognosis and additionally agrees to follow up as discussed.  She also agrees with the care-plan and conveys that all of her questions have been answered.  I have also provided discharge instructions for her that include: educational information regarding their diagnosis and treatment, and list of reasons why they would want to return to the ED prior to their follow-up appointment, should her condition change.     CLINICAL IMPRESSION    Discharge Note: The patient is stable for discharge home. The signs, symptoms, diagnosis, and discharge instructions have been discussed, understanding conveyed, and agreed upon. The patient is to follow up as recommended or return to ER should their symptoms worsen.      PATIENT REFERRED TO:  Kalyani Ho MD  [address]  [facility] [address] [phone]    In 1 week         DISCHARGE MEDICATIONS:

## 2025-02-13 NOTE — ED NOTES
Pt continues to take off monitoring, does not want to wear at this time. Awaiting discussion with ED Provider for plan of care.

## 2025-02-13 NOTE — ED NOTES
Pt states she didn't use the specimen cup, does not need to urinate anymore and would like to leave.

## 2025-02-13 NOTE — ED NOTES
Pt has asked when she can go home, would like us to call a cab for her. Pt wants to know why she is still here. ED Provider aware, to speak with pt when available.

## 2025-02-13 NOTE — ED NOTES
Pt has taken out own IV, is in hallway dressed, states she needs to go home now. ED Provider has reassured pt we will be checking a urine specimen.